# Patient Record
Sex: FEMALE | Race: WHITE
[De-identification: names, ages, dates, MRNs, and addresses within clinical notes are randomized per-mention and may not be internally consistent; named-entity substitution may affect disease eponyms.]

---

## 2018-09-10 ENCOUNTER — HOSPITAL ENCOUNTER (OUTPATIENT)
Dept: HOSPITAL 92 - ULT | Age: 63
Discharge: HOME | End: 2018-09-10
Payer: COMMERCIAL

## 2018-09-10 DIAGNOSIS — I31.3: ICD-10-CM

## 2018-09-10 DIAGNOSIS — G45.9: Primary | ICD-10-CM

## 2018-09-10 DIAGNOSIS — I08.1: ICD-10-CM

## 2018-09-10 PROCEDURE — 93880 EXTRACRANIAL BILAT STUDY: CPT

## 2018-09-10 PROCEDURE — 93306 TTE W/DOPPLER COMPLETE: CPT

## 2018-09-10 NOTE — ULT
BILATERAL CAROTID DUPLEX ULTRASOUND:

 

HISTORY: 

TIA.

 

FINDINGS: 

A small amount of plaque is seen in the carotid bulbs.

 

The peak systolic velocity in the right ICA measures 85 cm/s with an end-diastolic velocity of 26 cm/
s and a systolic ratio of 0.84.

 

The peak systolic velocity in the left ICA measures 68 cm/s with an end-diastolic velocity of 19 cm/s
 and a systolic ratio of 0.86.

 

Flow in both vertebral arteries remains antegrade.

 

IMPRESSION: 

No evidence of hemodynamically significant stenosis.

 

POS: [facility]

## 2018-12-02 ENCOUNTER — HOSPITAL ENCOUNTER (EMERGENCY)
Dept: HOSPITAL 92 - ERS | Age: 63
Discharge: HOME | End: 2018-12-02
Payer: COMMERCIAL

## 2018-12-02 DIAGNOSIS — E78.5: ICD-10-CM

## 2018-12-02 DIAGNOSIS — M48.061: Primary | ICD-10-CM

## 2018-12-02 DIAGNOSIS — I10: ICD-10-CM

## 2018-12-02 DIAGNOSIS — E03.9: ICD-10-CM

## 2018-12-02 DIAGNOSIS — E06.3: ICD-10-CM

## 2018-12-02 LAB
CRYSTAL-AUWI FLAG: 0 (ref 0–15)
HEV IGM SER QL: 2.7 (ref 0–7.99)
HYALINE CASTS #/AREA URNS LPF: (no result) LPF
PATHC CAST-AUWI FLAG: 0 (ref 0–2.49)
RBC UR QL AUTO: (no result) HPF (ref 0–3)
SP GR UR STRIP: 1 (ref 1–1.04)
SPERM-AUWI FLAG: 0 (ref 0–9.9)
WBC UR QL AUTO: (no result) HPF (ref 0–3)
YEAST-AUWI FLAG: 0 (ref 0–25)

## 2018-12-02 PROCEDURE — 72131 CT LUMBAR SPINE W/O DYE: CPT

## 2018-12-02 PROCEDURE — 81015 MICROSCOPIC EXAM OF URINE: CPT

## 2018-12-02 PROCEDURE — 81003 URINALYSIS AUTO W/O SCOPE: CPT

## 2018-12-02 NOTE — CT
CT LUMBAR SPINE NONCONTRAST:

 

12/02/2018

 

HISTORY:

A 63-year-old female with low back pain and right lumbar radiculopathy.

 

COMPARISON:

None.

 

FINDINGS:

The right kidney is atrophic.  Minimally dilated left renal collecting system.  There are five lumbar
 type vertebrae.  Vertebral body heights are maintained.  Mild disk space narrowing at L4-L5.  Modera
te disk space narrowing at L5-S1.  The rest of the disk spaces are maintained.  No large endplate mar
ginal osteophytes.

 

T12-L1:  Normal.

 

L1-L2:  Essentially normal.

 

L2-L3:  Mild degenerative facet changes.  No high-grade central or neural foraminal stenosis.  Mild d
iffuse disk bulge.

 

L3-L4:  Mild diffuse disk bulge.  Mild to moderate ligamentum flavum thickening.  Mild to moderate bi
lateral degenerative facet hypertrophy.  Mild central spinal canal stenosis.  Mild bilateral neural f
oraminal stenosis.

 

L4-L5:  Very severe bilateral degenerative facet disease causes a grade 1 anterolisthesis of L4 on L5
.  No pars interarticularis defects.  Severe ligamentum flavum thickening.  Severe central spinal can
al stenosis.  Moderate bilateral neural foraminal stenosis.

 

L5-S1:  Moderate to severe bilateral degenerative facet changes.  No central stenosis.  Mild to moder
ate right neural foraminal stenosis.  Moderate left neural foraminal stenosis.

 

IMPRESSION:

1.  At L4-L5, there is severe central spinal canal stenosis due to grade 1 spondylolisthesis, which i
s due to very severe facet osteoarthrosis.

 

2.  Atrophy of the right kidney.

 

POS: [facility]

## 2020-07-20 ENCOUNTER — HOSPITAL ENCOUNTER (OUTPATIENT)
Dept: HOSPITAL 57 - BURLAB | Age: 65
Discharge: HOME | End: 2020-07-20
Attending: FAMILY MEDICINE
Payer: COMMERCIAL

## 2020-07-20 DIAGNOSIS — R25.2: Primary | ICD-10-CM

## 2020-07-20 LAB
ANION GAP SERPL CALC-SCNC: 14 MMOL/L (ref 10–20)
BUN SERPL-MCNC: 21 MG/DL (ref 9.8–20.1)
CALCIUM SERPL-MCNC: 9 MG/DL (ref 7.8–10.44)
CHLORIDE SERPL-SCNC: 106 MMOL/L (ref 98–107)
CO2 SERPL-SCNC: 26 MMOL/L (ref 23–31)
CREAT CL PREDICTED SERPL C-G-VRATE: 0 ML/MIN (ref 70–130)
GLUCOSE SERPL-MCNC: 99 MG/DL (ref 80–115)
MAGNESIUM SERPL-MCNC: 2.4 MG/DL (ref 1.6–2.6)
POTASSIUM SERPL-SCNC: 3.7 MMOL/L (ref 3.5–5.1)
SODIUM SERPL-SCNC: 142 MMOL/L (ref 136–145)

## 2020-07-20 PROCEDURE — 80048 BASIC METABOLIC PNL TOTAL CA: CPT

## 2020-07-20 PROCEDURE — 36415 COLL VENOUS BLD VENIPUNCTURE: CPT

## 2020-07-20 PROCEDURE — 83735 ASSAY OF MAGNESIUM: CPT

## 2020-12-17 ENCOUNTER — HOSPITAL ENCOUNTER (EMERGENCY)
Dept: HOSPITAL 92 - ERS | Age: 65
Discharge: HOME | End: 2020-12-17
Payer: SELF-PAY

## 2020-12-17 DIAGNOSIS — I10: ICD-10-CM

## 2020-12-17 DIAGNOSIS — E03.9: ICD-10-CM

## 2020-12-17 DIAGNOSIS — E78.5: ICD-10-CM

## 2020-12-17 DIAGNOSIS — U07.1: Primary | ICD-10-CM

## 2020-12-17 LAB — SARS-COV-2 RNA RESP QL NAA+PROBE: DETECTED

## 2020-12-17 PROCEDURE — U0003 INFECTIOUS AGENT DETECTION BY NUCLEIC ACID (DNA OR RNA); SEVERE ACUTE RESPIRATORY SYNDROME CORONAVIRUS 2 (SARS-COV-2) (CORONAVIRUS DISEASE [COVID-19]), AMPLIFIED PROBE TECHNIQUE, MAKING USE OF HIGH THROUGHPUT TECHNOLOGIES AS DESCRIBED BY CMS-2020-01-R: HCPCS

## 2020-12-17 PROCEDURE — 87635 SARS-COV-2 COVID-19 AMP PRB: CPT

## 2020-12-17 PROCEDURE — 99283 EMERGENCY DEPT VISIT LOW MDM: CPT

## 2020-12-25 ENCOUNTER — HOSPITAL ENCOUNTER (EMERGENCY)
Dept: HOSPITAL 92 - ERS | Age: 65
Discharge: HOME | End: 2020-12-25
Payer: MEDICARE

## 2020-12-25 DIAGNOSIS — I11.0: ICD-10-CM

## 2020-12-25 DIAGNOSIS — E78.5: ICD-10-CM

## 2020-12-25 DIAGNOSIS — E03.9: ICD-10-CM

## 2020-12-25 DIAGNOSIS — U07.1: Primary | ICD-10-CM

## 2020-12-25 DIAGNOSIS — I50.30: ICD-10-CM

## 2020-12-25 DIAGNOSIS — Z79.899: ICD-10-CM

## 2020-12-25 PROCEDURE — 99284 EMERGENCY DEPT VISIT MOD MDM: CPT

## 2020-12-27 ENCOUNTER — HOSPITAL ENCOUNTER (INPATIENT)
Dept: HOSPITAL 92 - ERS | Age: 65
LOS: 11 days | Discharge: HOME | DRG: 177 | End: 2021-01-07
Attending: INTERNAL MEDICINE | Admitting: FAMILY MEDICINE
Payer: MEDICARE

## 2020-12-27 VITALS — BODY MASS INDEX: 27.1 KG/M2

## 2020-12-27 DIAGNOSIS — I16.0: ICD-10-CM

## 2020-12-27 DIAGNOSIS — Z88.8: ICD-10-CM

## 2020-12-27 DIAGNOSIS — E03.9: ICD-10-CM

## 2020-12-27 DIAGNOSIS — I13.0: ICD-10-CM

## 2020-12-27 DIAGNOSIS — E78.5: ICD-10-CM

## 2020-12-27 DIAGNOSIS — Z98.890: ICD-10-CM

## 2020-12-27 DIAGNOSIS — J12.82: ICD-10-CM

## 2020-12-27 DIAGNOSIS — N18.4: ICD-10-CM

## 2020-12-27 DIAGNOSIS — J96.01: ICD-10-CM

## 2020-12-27 DIAGNOSIS — Z79.899: ICD-10-CM

## 2020-12-27 DIAGNOSIS — Z86.73: ICD-10-CM

## 2020-12-27 DIAGNOSIS — Z79.890: ICD-10-CM

## 2020-12-27 DIAGNOSIS — I50.32: ICD-10-CM

## 2020-12-27 DIAGNOSIS — Z87.891: ICD-10-CM

## 2020-12-27 DIAGNOSIS — Z79.82: ICD-10-CM

## 2020-12-27 DIAGNOSIS — U07.1: Primary | ICD-10-CM

## 2020-12-27 DIAGNOSIS — Z90.710: ICD-10-CM

## 2020-12-27 DIAGNOSIS — Z79.51: ICD-10-CM

## 2020-12-27 LAB
ALBUMIN SERPL BCG-MCNC: 3.6 G/DL (ref 3.4–4.8)
ALP SERPL-CCNC: 80 U/L (ref 40–110)
ALT SERPL W P-5'-P-CCNC: 27 U/L (ref 8–55)
ANION GAP SERPL CALC-SCNC: 16 MMOL/L (ref 10–20)
AST SERPL-CCNC: 36 U/L (ref 5–34)
BACTERIA UR QL AUTO: (no result) HPF
BILIRUB SERPL-MCNC: 0.5 MG/DL (ref 0.2–1.2)
BUN SERPL-MCNC: 23 MG/DL (ref 9.8–20.1)
CALCIUM SERPL-MCNC: 8.6 MG/DL (ref 7.8–10.44)
CHLORIDE SERPL-SCNC: 101 MMOL/L (ref 98–107)
CO2 SERPL-SCNC: 23 MMOL/L (ref 23–31)
CREAT CL PREDICTED SERPL C-G-VRATE: 0 ML/MIN (ref 70–130)
FERRITIN SERPL-MCNC: 1277.35 NG/ML (ref 10–291)
GLOBULIN SER CALC-MCNC: 3.1 G/DL (ref 2.4–3.5)
GLUCOSE SERPL-MCNC: 125 MG/DL (ref 80–115)
HGB BLD-MCNC: 15.7 G/DL (ref 12–16)
LEUKOCYTE ESTERASE UR QL STRIP.AUTO: 250 LEU/UL
MCH RBC QN AUTO: 32.3 PG (ref 27–31)
MCV RBC AUTO: 97.6 FL (ref 78–98)
MDIFF COMPLETE?: YES
PLATELET # BLD AUTO: 213 THOU/UL (ref 130–400)
POTASSIUM SERPL-SCNC: 3.7 MMOL/L (ref 3.5–5.1)
PROT UR STRIP.AUTO-MCNC: 100 MG/DL
RBC # BLD AUTO: 4.87 MILL/UL (ref 4.2–5.4)
RBC UR QL AUTO: (no result) HPF (ref 0–3)
SODIUM SERPL-SCNC: 136 MMOL/L (ref 136–145)
SP GR UR STRIP: 1.02 (ref 1–1.04)
TSH SERPL DL<=0.005 MIU/L-ACNC: 0.17 UIU/ML (ref 0.35–4.94)
WBC # BLD AUTO: 15.7 THOU/UL (ref 4.8–10.8)
WBC UR QL AUTO: (no result) HPF (ref 0–3)

## 2020-12-27 PROCEDURE — XW13325 TRANSFUSION OF CONVALESCENT PLASMA (NONAUTOLOGOUS) INTO PERIPHERAL VEIN, PERCUTANEOUS APPROACH, NEW TECHNOLOGY GROUP 5: ICD-10-PCS | Performed by: PHYSICIAN ASSISTANT

## 2020-12-27 PROCEDURE — 71045 X-RAY EXAM CHEST 1 VIEW: CPT

## 2020-12-27 PROCEDURE — 85652 RBC SED RATE AUTOMATED: CPT

## 2020-12-27 PROCEDURE — 99284 EMERGENCY DEPT VISIT MOD MDM: CPT

## 2020-12-27 PROCEDURE — 83605 ASSAY OF LACTIC ACID: CPT

## 2020-12-27 PROCEDURE — 85025 COMPLETE CBC W/AUTO DIFF WBC: CPT

## 2020-12-27 PROCEDURE — 81003 URINALYSIS AUTO W/O SCOPE: CPT

## 2020-12-27 PROCEDURE — 81015 MICROSCOPIC EXAM OF URINE: CPT

## 2020-12-27 PROCEDURE — 8E0ZXY6 ISOLATION: ICD-10-PCS | Performed by: PEDIATRICS

## 2020-12-27 PROCEDURE — 86140 C-REACTIVE PROTEIN: CPT

## 2020-12-27 PROCEDURE — 70450 CT HEAD/BRAIN W/O DYE: CPT

## 2020-12-27 PROCEDURE — 86900 BLOOD TYPING SEROLOGIC ABO: CPT

## 2020-12-27 PROCEDURE — 87040 BLOOD CULTURE FOR BACTERIA: CPT

## 2020-12-27 PROCEDURE — 96375 TX/PRO/DX INJ NEW DRUG ADDON: CPT

## 2020-12-27 PROCEDURE — 86901 BLOOD TYPING SEROLOGIC RH(D): CPT

## 2020-12-27 PROCEDURE — 36415 COLL VENOUS BLD VENIPUNCTURE: CPT

## 2020-12-27 PROCEDURE — P9017 PLASMA 1 DONOR FRZ W/IN 8 HR: HCPCS

## 2020-12-27 PROCEDURE — 36430 TRANSFUSION BLD/BLD COMPNT: CPT

## 2020-12-27 PROCEDURE — 84484 ASSAY OF TROPONIN QUANT: CPT

## 2020-12-27 PROCEDURE — 85379 FIBRIN DEGRADATION QUANT: CPT

## 2020-12-27 PROCEDURE — 83880 ASSAY OF NATRIURETIC PEPTIDE: CPT

## 2020-12-27 PROCEDURE — 86850 RBC ANTIBODY SCREEN: CPT

## 2020-12-27 PROCEDURE — 96365 THER/PROPH/DIAG IV INF INIT: CPT

## 2020-12-27 PROCEDURE — XW033E5 INTRODUCTION OF REMDESIVIR ANTI-INFECTIVE INTO PERIPHERAL VEIN, PERCUTANEOUS APPROACH, NEW TECHNOLOGY GROUP 5: ICD-10-PCS | Performed by: PHYSICIAN ASSISTANT

## 2020-12-27 PROCEDURE — 84443 ASSAY THYROID STIM HORMONE: CPT

## 2020-12-27 PROCEDURE — 80048 BASIC METABOLIC PNL TOTAL CA: CPT

## 2020-12-27 PROCEDURE — 93005 ELECTROCARDIOGRAM TRACING: CPT

## 2020-12-27 PROCEDURE — 82728 ASSAY OF FERRITIN: CPT

## 2020-12-27 PROCEDURE — 83615 LACTATE (LD) (LDH) ENZYME: CPT

## 2020-12-27 PROCEDURE — 80053 COMPREHEN METABOLIC PANEL: CPT

## 2020-12-27 RX ADMIN — HEPARIN SODIUM SCH UNITS: 5000 INJECTION, SOLUTION INTRAVENOUS; SUBCUTANEOUS at 15:17

## 2020-12-27 RX ADMIN — HEPARIN SODIUM SCH UNITS: 5000 INJECTION, SOLUTION INTRAVENOUS; SUBCUTANEOUS at 20:57

## 2020-12-27 RX ADMIN — CEFTRIAXONE SCH MLS: 1 INJECTION, POWDER, FOR SOLUTION INTRAMUSCULAR; INTRAVENOUS at 16:28

## 2020-12-27 RX ADMIN — AZITHROMYCIN SCH MLS: 500 INJECTION, POWDER, LYOPHILIZED, FOR SOLUTION INTRAVENOUS at 15:17

## 2020-12-27 NOTE — RAD
Chest AP view



INDICATION: History of shortness of breath with Covid positive diagnosis



COMPARISON: Prior exam dated September 30, 2013



FINDINGS:



Lungs:  There are interstitial and airspace opacities in the left lower lobe and right lower lobe robert
picious for pneumonia



Cardiac silhouette:  There is mild cardiomegaly



Pulmonary vasculature:  Normal



Pleural spaces:  No pleural effusion or pneumothorax is demonstrated.



Upper abdomen:  No abnormality seen.



Osseous structures:  No acute osseous abnormality.



Additional findings:  None.



IMPRESSION: 

Bilateral lower lobe pneumonia.

Mild cardiomegaly without evidence of cardiac decompensation.



Reported By: Montrell Alfonso 

Electronically Signed:  12/27/2020 9:30 AM

## 2020-12-27 NOTE — CT
CT BRAIN NONCONTRAST:



DATE:

12/27/2020



HISTORY:

65-year-old female with severe headache



FINDINGS:

There is no evidence of acute intra-axial or extra-axial hemorrhage. There is no midline shift or any
 other mass effect. There is no extra-axial fluid collection. There is no evidence of obstructive

hydrocephalus. Calvarium is intact.



IMPRESSION:

No acute intracranial findings.



Reported By: Donavan Jennings 

Electronically Signed:  12/27/2020 10:27 PM

## 2020-12-27 NOTE — PDOC.EVN
Event Note





- Event Note


Event Note: 





Was called by nursing that patient was having severe hip pain and could hear 

patient moaning in background. No known injury to hip. One time dose of pain 

medication ordered along with lidocaine patch.

## 2020-12-27 NOTE — PDOC.BPN
- Brief Progress Note


Encounter Date: 12/27/20


Encounter Time: 15:55





Chart reviewed, orders reviewed and patient discussed with ARMINDA Magallanes.  Pt with 

multilobar pneumonia with sx onset a week ago.  She was significantly hypoxic 

and responded well to oxygen supplementation.  She is written for steroids, 

remdesivir, convalescent plasma.  Monitor closely for any change in respiratory 

status.

## 2020-12-27 NOTE — PDOC.EVN
Event Note





- Event Note


Event Note: 





Nursing called and said that patient was having severe headache and it was the 

"worst of her life". Stat head CT ordered at this time. No focal deficits or 

neurological changes noted.

## 2020-12-27 NOTE — PDOC.HHP
Hospitalist HPI





- History of Present Illness


Shortness of breath


History of Present Illness: 





Ms. De La Torre is a 65-year-old female with a past medical history of 

hypothyroidism, hypertension, hyperlipidemia, TIA, congestive heart failure, 

chronic kidney disease who presented to the emergency room for shortness of 

breath with COVID-19 positive status.  Patient reports that her symptoms started

with nausea, vomiting and diarrhea approximately 1 week ago, however her 

symptoms of cough shortness of breath myalgias and subjective fevers began a few

days later.  She states that her respiratory symptoms worsened and she presented

to the emergency room on 12/25 but at that time was maintaining her oxygenation 

at 97% on room air so was discharged home.  Patient reports that over the past 2

days she has had worsening of her breathing and increasing shortness of breath 

even at rest prompting her to represent.





In emergency room initial vital signs 150/78, 102, 70, 21, 76% on room air.  

Patient's respiratory status greatly improved to 96% on room air on 4 L nasal 

cannula.  EKG showed normal sinus rhythm with inverted T waves but no ST 

changes.  Initial troponin 0 0.010.  Chest x-ray showed diffuse bilateral patchy

infiltrates and mild cardiomegaly.  .  H/H 15.7/47.5.  WBC 15.7.  BUN/CR 

23/1.79.  Sodium 136, potassium 3.7.  Glucose 125.  AST/ALT 36/27, T bili 0.5, 

D-dimer 0.99.  Patient received azithromycin and dexamethasone in the emergency 

room.  She admitted to the hospital service for further management of her COVID-

19 pneumonia.





Hospitalist ROS





- Review of Systems


Constitutional: reports: fever, chills, weakness, malaise


Eyes: denies: pain, vision change, conjunctivae inflammation, eyelid 

inflammation, redness, other


ENT: reports: nose congestion, throat pain.  denies: ear pain, ear discharge, 

nose pain, nose discharge, mouth pain, mouth swelling, throat swelling, other


Respiratory: reports: cough, dry, shortness of breath, SOB with excertion.  

denies: hemoptysis, pleuritic pain, sputum, wheezing, other


Cardiovascular: denies: chest pain, palpitations, orthopnea, paroxysmal noc. 

dyspnea, edema, light headedness, other


Gastrointestinal: denies: nausea, vomiting, abdominal pain, diarrhea, 

constipation, melena, hematochezia, other


Genitourinary: denies: dysuria, frequency, incontinence, hematuria, retention, 

other


Musculoskeletal: denies: neck pain, shoulder pain, arm pain, back pain, hand 

pain, leg pain, foot pain, other


Skin: denies: rash, lesions, arpan, bruising, other


Neurological: denies: weakness, numbness, incoordination, change in speech, 

confusion, seizures, other





- Medication


Medications: 


All medications include





Albuterol


Bupropion


Clonidine


Clopidogrel


Coreg


Dexamethasone


Hydralazine


Klor-Con


Levothyroxine


Zofran





Allergy to lisinopril which causes angioedema





Hospitalist History





- Past Medical History


Other Medical History: 





Past medical history includes





Hypothyroidism


Hypertension


Congestive heart failure


Hyperlipidemia


TIA


Chronic kidney disease stage IV


Remote methamphetamine use, tobacco use








- Past Surgical History


Other Surgical History: 





Past surgical history includes





Carpal tunnel sertraline


Hysterectomy


Oophorectomy





- Family History


Other Family History: 





Denies family history of cardiac disease or cancer





- Social History


Smoking Status: Former smoker


Tobacco Type: cigarettes


Alcohol: reports: Rare


Drugs: reports: none (Former methamphetamine abuse)


Living Situation: Alone


Activity level: independent ambulation





- Exam


General Appearance: NAD, awake alert


General - other findings: Patient appears comfortable on 4 L nasal cannula


Eye: PERRL, anicteric sclera


ENT: normocephalic atraumatic, no oropharyngeal lesions, moist mucosa


Neck: supple, symmetric, no JVD, no thyromegaly, no lymphadenopathy, no carotid 

bruit


Heart: RRR, no murmur, no gallops, no rubs, normal peripheral pulses


Respiratory: normal chest expansion, no tachypnea


Respiratory - other findings: Rales throughout


Gastrointestinal: soft, non-tender, non-distended, normal bowel sounds, no 

palpable masses, no hepatomegaly, no splenomegaly, no bruit


Extremities: no cyanosis, no clubbing, no edema


Skin: normal turgor, no lesions, no rashes


Neurological: cranial nerve grossly intact, normal sensation to touch, no 

weakness, no focal deficits, no new deficit


Musculoskeletal: normal tone, normal strength, no muscle wasting


Psychiatric: normal affect, normal behavior, A&O x 3





Hospitalist Results





- Labs


Result Diagrams: 


                                 12/27/20 09:37





                                 12/27/20 09:37


Lab results: 


                                        











WBC  15.7 thou/uL (4.8-10.8)  H  12/27/20  09:37    


 


Hgb  15.7 g/dL (12.0-16.0)   12/27/20  09:37    


 


Hct  47.5 % (36.0-47.0)  H  12/27/20  09:37    


 


MCV  97.6 fL (78.0-98.0)   12/27/20  09:37    


 


Plt Count  213 thou/uL (130-400)   12/27/20  09:37    


 


Sodium  136 mmol/L (136-145)   12/27/20  09:37    


 


Potassium  3.7 mmol/L (3.5-5.1)   12/27/20  09:37    


 


Chloride  101 mmol/L ()   12/27/20  09:37    


 


Carbon Dioxide  23 mmol/L (23-31)   12/27/20  09:37    


 


BUN  23 mg/dL (9.8-20.1)  H  12/27/20  09:37    


 


Creatinine  1.79 mg/dL (0.6-1.1)  H  12/27/20  09:37    


 


Glucose  125 mg/dL ()  H  12/27/20  09:37    


 


Lactic Acid  1.4 mmol/L (0.5-2.2)   12/27/20  09:37    


 


Calcium  8.6 mg/dL (7.8-10.44)   12/27/20  09:37    


 


Total Bilirubin  0.5 mg/dL (0.2-1.2)   12/27/20  09:37    


 


AST  36 U/L (5-34)  H  12/27/20  09:37    


 


ALT  27 U/L (8-55)   12/27/20  09:37    


 


Alkaline Phosphatase  80 U/L ()   12/27/20  09:37    


 


Troponin I  Less than  0.010 ng/mL (< 0.028)   12/27/20  09:37    


 


B-Natriuretic Peptide  204.9 pg/mL (0-100)  H  12/27/20  09:37    


 


Serum Total Protein  6.7 g/dL (6.0-8.3)   12/27/20  09:37    


 


Albumin  3.6 g/dL (3.4-4.8)   12/27/20  09:37    


 


Urine Ketones  Negative mg/dL (Negative)   12/27/20  10:08    


 


Urine Blood  Negative  (Negative)   12/27/20  10:08    


 


Urine Nitrite  Negative  (Negative)   12/27/20  10:08    


 


Ur Leukocyte Esterase  250 Rajinder/uL (Negative)  A  12/27/20  10:08    


 


Urine RBC  0-3 HPF (0-3)   12/27/20  10:08    


 


Urine WBC  7-10 HPF (0-3)  A  12/27/20  10:08    


 


Ur Squamous Epith Cells  0-3 HPF (0-3)   12/27/20  10:08    


 


Urine Bacteria  Rare-Few HPF (None Seen)   12/27/20  10:08    














Hospitalist H&P A/P





- Plan


Plan: 





COVID-19 pneumonia


65-year-old female past medical history of hypothyroidism, hypertension, 

hyperlipidemia, diastolic heart failure, TIA, chronic kidney disease presents 

with worsening shortness of breath found to have COVID-19.  Symptoms began 

approximately 7 days ago and have been progressive.  Chest x-ray showed 

bilateral patchy infiltrates and mild cardiomegaly.  White blood cell count 

15.7.  Patient presented with O2 sat of 76% on room air, is now comfortable 

saturating at 96% on 4 L nasal cannula.  Patient received dexamethasone and 

azithromycin in the emergency room.





Plan


-Decadron, will see patient is candidate for remdesivir


-Ceftriaxone, azithromycin


-Supplemental oxygen


-Tylenol, Robitussin


-Vitamin C, zinc


-We will obtain baseline inflammatory markers





Acute hypoxic respiratory failure


Patient with acute hypoxic respiratory failure secondary to moderate to severe 

COVID-19 pneumonia.  Presented with O2 sat of 76% on room air.  Patient with new

oxygen requirement now on 4 L of oxygen nasal cannula to maintain O2 sat.  We 

will continue supplemental oxygen and closely monitor respiratory status.  Poly

tment as above.





Plan


-Supplemental oxygen


-Treatment as above


-Closely monitor respiratory status





Chronic kidney disease


History of CKD with baseline creatinine approximately 1.7.  BUN/CR 23/1.79.





Plan


Trend kidney function


Avoid nephrotoxic agents when possible


Renal dosing as appropriate





Hypothyroidism


History of hypothyroidism, will continue home levothyroxine.





Hypertension


History of hypertension on home hydralazine and clonidine.  We will continue 

these.





Diastolic heart failure


History of diastolic heart failure.  On record review echocardiogram from 2018 

shows EF of 50 to 55%.  Patient reports she has mild fluid symptoms and 

occasionally gets lower extremity edema.  Feels as though she is dry due to her 

diarrheal symptoms.  .





Plan


Monitor fluid status





History of TIA


Patient with remote history of TIA, is maintained on Plavix.  We will continue 

while inpatient.








DVT prophylaxis SQ heparin


Full code





Case discussed with Dr. Hernandez

## 2020-12-28 LAB
ANION GAP SERPL CALC-SCNC: 13 MMOL/L (ref 10–20)
BASOPHILS # BLD AUTO: 0 THOU/UL (ref 0–0.2)
BASOPHILS NFR BLD AUTO: 0.1 % (ref 0–1)
BUN SERPL-MCNC: 25 MG/DL (ref 9.8–20.1)
CALCIUM SERPL-MCNC: 8.4 MG/DL (ref 7.8–10.44)
CHLORIDE SERPL-SCNC: 103 MMOL/L (ref 98–107)
CO2 SERPL-SCNC: 26 MMOL/L (ref 23–31)
CREAT CL PREDICTED SERPL C-G-VRATE: 37 ML/MIN (ref 70–130)
EOSINOPHIL # BLD AUTO: 0 THOU/UL (ref 0–0.7)
EOSINOPHIL NFR BLD AUTO: 0.1 % (ref 0–10)
GLUCOSE SERPL-MCNC: 80 MG/DL (ref 80–115)
HGB BLD-MCNC: 15.2 G/DL (ref 12–16)
LYMPHOCYTES # BLD: 0.7 THOU/UL (ref 1.2–3.4)
LYMPHOCYTES NFR BLD AUTO: 4.4 % (ref 21–51)
MCH RBC QN AUTO: 32 PG (ref 27–31)
MCV RBC AUTO: 96.9 FL (ref 78–98)
MONOCYTES # BLD AUTO: 0.8 THOU/UL (ref 0.11–0.59)
MONOCYTES NFR BLD AUTO: 4.8 % (ref 0–10)
NEUTROPHILS # BLD AUTO: 15.3 THOU/UL (ref 1.4–6.5)
NEUTROPHILS NFR BLD AUTO: 90.6 % (ref 42–75)
PLATELET # BLD AUTO: 289 THOU/UL (ref 130–400)
POTASSIUM SERPL-SCNC: 3.8 MMOL/L (ref 3.5–5.1)
RBC # BLD AUTO: 4.75 MILL/UL (ref 4.2–5.4)
SODIUM SERPL-SCNC: 138 MMOL/L (ref 136–145)
WBC # BLD AUTO: 16.9 THOU/UL (ref 4.8–10.8)

## 2020-12-28 RX ADMIN — CEFTRIAXONE SCH MLS: 1 INJECTION, POWDER, FOR SOLUTION INTRAMUSCULAR; INTRAVENOUS at 16:05

## 2020-12-28 RX ADMIN — Medication SCH EACH: at 08:03

## 2020-12-28 RX ADMIN — AZITHROMYCIN SCH MLS: 500 INJECTION, POWDER, LYOPHILIZED, FOR SOLUTION INTRAVENOUS at 14:31

## 2020-12-28 RX ADMIN — BUPROPION HYDROCHLORIDE SCH MG: 150 TABLET, FILM COATED, EXTENDED RELEASE ORAL at 08:02

## 2020-12-28 RX ADMIN — Medication SCH MG: at 08:02

## 2020-12-28 RX ADMIN — HEPARIN SODIUM SCH UNITS: 5000 INJECTION, SOLUTION INTRAVENOUS; SUBCUTANEOUS at 08:01

## 2020-12-28 RX ADMIN — HEPARIN SODIUM SCH UNITS: 5000 INJECTION, SOLUTION INTRAVENOUS; SUBCUTANEOUS at 14:29

## 2020-12-28 RX ADMIN — HEPARIN SODIUM SCH UNITS: 5000 INJECTION, SOLUTION INTRAVENOUS; SUBCUTANEOUS at 20:15

## 2020-12-29 LAB
ANION GAP SERPL CALC-SCNC: 17 MMOL/L (ref 10–20)
BUN SERPL-MCNC: 26 MG/DL (ref 9.8–20.1)
CALCIUM SERPL-MCNC: 8.4 MG/DL (ref 7.8–10.44)
CHLORIDE SERPL-SCNC: 102 MMOL/L (ref 98–107)
CO2 SERPL-SCNC: 23 MMOL/L (ref 23–31)
CREAT CL PREDICTED SERPL C-G-VRATE: 40 ML/MIN (ref 70–130)
GLUCOSE SERPL-MCNC: 97 MG/DL (ref 80–115)
HGB BLD-MCNC: 15.4 G/DL (ref 12–16)
MCH RBC QN AUTO: 31.9 PG (ref 27–31)
MCV RBC AUTO: 97.3 FL (ref 78–98)
MDIFF COMPLETE?: YES
PLATELET # BLD AUTO: 275 THOU/UL (ref 130–400)
POTASSIUM SERPL-SCNC: 3.5 MMOL/L (ref 3.5–5.1)
RBC # BLD AUTO: 4.82 MILL/UL (ref 4.2–5.4)
SODIUM SERPL-SCNC: 138 MMOL/L (ref 136–145)
WBC # BLD AUTO: 9.9 THOU/UL (ref 4.8–10.8)

## 2020-12-29 RX ADMIN — CEFTRIAXONE SCH MLS: 1 INJECTION, POWDER, FOR SOLUTION INTRAMUSCULAR; INTRAVENOUS at 14:19

## 2020-12-29 RX ADMIN — HEPARIN SODIUM SCH UNITS: 5000 INJECTION, SOLUTION INTRAVENOUS; SUBCUTANEOUS at 09:03

## 2020-12-29 RX ADMIN — BUPROPION HYDROCHLORIDE SCH MG: 150 TABLET, FILM COATED, EXTENDED RELEASE ORAL at 09:02

## 2020-12-29 RX ADMIN — Medication SCH EACH: at 10:06

## 2020-12-29 RX ADMIN — Medication SCH MG: at 09:02

## 2020-12-29 RX ADMIN — HEPARIN SODIUM SCH UNITS: 5000 INJECTION, SOLUTION INTRAVENOUS; SUBCUTANEOUS at 20:53

## 2020-12-29 RX ADMIN — HEPARIN SODIUM SCH UNITS: 5000 INJECTION, SOLUTION INTRAVENOUS; SUBCUTANEOUS at 14:19

## 2020-12-30 LAB
ANION GAP SERPL CALC-SCNC: 16 MMOL/L (ref 10–20)
BASOPHILS # BLD AUTO: 0 THOU/UL (ref 0–0.2)
BASOPHILS NFR BLD AUTO: 0 % (ref 0–1)
BUN SERPL-MCNC: 27 MG/DL (ref 9.8–20.1)
CALCIUM SERPL-MCNC: 8.6 MG/DL (ref 7.8–10.44)
CHLORIDE SERPL-SCNC: 102 MMOL/L (ref 98–107)
CO2 SERPL-SCNC: 24 MMOL/L (ref 23–31)
CREAT CL PREDICTED SERPL C-G-VRATE: 39 ML/MIN (ref 70–130)
EOSINOPHIL # BLD AUTO: 0 THOU/UL (ref 0–0.7)
EOSINOPHIL NFR BLD AUTO: 0.2 % (ref 0–10)
GLUCOSE SERPL-MCNC: 109 MG/DL (ref 80–115)
HGB BLD-MCNC: 14.5 G/DL (ref 12–16)
LYMPHOCYTES # BLD: 1 THOU/UL (ref 1.2–3.4)
LYMPHOCYTES NFR BLD AUTO: 8.6 % (ref 21–51)
MCH RBC QN AUTO: 31.4 PG (ref 27–31)
MCV RBC AUTO: 97.5 FL (ref 78–98)
MONOCYTES # BLD AUTO: 0.4 THOU/UL (ref 0.11–0.59)
MONOCYTES NFR BLD AUTO: 3.6 % (ref 0–10)
NEUTROPHILS # BLD AUTO: 9.8 THOU/UL (ref 1.4–6.5)
NEUTROPHILS NFR BLD AUTO: 87.6 % (ref 42–75)
PLATELET # BLD AUTO: 300 THOU/UL (ref 130–400)
POTASSIUM SERPL-SCNC: 3.6 MMOL/L (ref 3.5–5.1)
RBC # BLD AUTO: 4.62 MILL/UL (ref 4.2–5.4)
SODIUM SERPL-SCNC: 138 MMOL/L (ref 136–145)
WBC # BLD AUTO: 11.2 THOU/UL (ref 4.8–10.8)

## 2020-12-30 RX ADMIN — HEPARIN SODIUM SCH UNITS: 5000 INJECTION, SOLUTION INTRAVENOUS; SUBCUTANEOUS at 20:35

## 2020-12-30 RX ADMIN — BUPROPION HYDROCHLORIDE SCH MG: 150 TABLET, FILM COATED, EXTENDED RELEASE ORAL at 08:29

## 2020-12-30 RX ADMIN — Medication SCH EACH: at 08:31

## 2020-12-30 RX ADMIN — CEFTRIAXONE SCH MLS: 1 INJECTION, POWDER, FOR SOLUTION INTRAMUSCULAR; INTRAVENOUS at 16:02

## 2020-12-30 RX ADMIN — HEPARIN SODIUM SCH UNITS: 5000 INJECTION, SOLUTION INTRAVENOUS; SUBCUTANEOUS at 08:29

## 2020-12-30 RX ADMIN — Medication SCH MG: at 08:30

## 2020-12-30 RX ADMIN — HEPARIN SODIUM SCH UNITS: 5000 INJECTION, SOLUTION INTRAVENOUS; SUBCUTANEOUS at 15:16

## 2020-12-31 LAB
ANION GAP SERPL CALC-SCNC: 15 MMOL/L (ref 10–20)
BASOPHILS # BLD AUTO: 0 THOU/UL (ref 0–0.2)
BASOPHILS NFR BLD AUTO: 0.1 % (ref 0–1)
BUN SERPL-MCNC: 24 MG/DL (ref 9.8–20.1)
CALCIUM SERPL-MCNC: 8.4 MG/DL (ref 7.8–10.44)
CHLORIDE SERPL-SCNC: 101 MMOL/L (ref 98–107)
CO2 SERPL-SCNC: 24 MMOL/L (ref 23–31)
CREAT CL PREDICTED SERPL C-G-VRATE: 45 ML/MIN (ref 70–130)
EOSINOPHIL # BLD AUTO: 0 THOU/UL (ref 0–0.7)
EOSINOPHIL NFR BLD AUTO: 0.1 % (ref 0–10)
GLUCOSE SERPL-MCNC: 83 MG/DL (ref 80–115)
HGB BLD-MCNC: 13.8 G/DL (ref 12–16)
LYMPHOCYTES # BLD: 1 THOU/UL (ref 1.2–3.4)
LYMPHOCYTES NFR BLD AUTO: 8.5 % (ref 21–51)
MCH RBC QN AUTO: 32.3 PG (ref 27–31)
MCV RBC AUTO: 96.9 FL (ref 78–98)
MONOCYTES # BLD AUTO: 0.6 THOU/UL (ref 0.11–0.59)
MONOCYTES NFR BLD AUTO: 5 % (ref 0–10)
NEUTROPHILS # BLD AUTO: 9.8 THOU/UL (ref 1.4–6.5)
NEUTROPHILS NFR BLD AUTO: 86.2 % (ref 42–75)
PLATELET # BLD AUTO: 283 THOU/UL (ref 130–400)
POTASSIUM SERPL-SCNC: 4 MMOL/L (ref 3.5–5.1)
RBC # BLD AUTO: 4.27 MILL/UL (ref 4.2–5.4)
SODIUM SERPL-SCNC: 136 MMOL/L (ref 136–145)
WBC # BLD AUTO: 11.3 THOU/UL (ref 4.8–10.8)

## 2020-12-31 RX ADMIN — HEPARIN SODIUM SCH UNITS: 5000 INJECTION, SOLUTION INTRAVENOUS; SUBCUTANEOUS at 20:34

## 2020-12-31 RX ADMIN — Medication SCH EACH: at 08:19

## 2020-12-31 RX ADMIN — BUPROPION HYDROCHLORIDE SCH MG: 150 TABLET, FILM COATED, EXTENDED RELEASE ORAL at 08:17

## 2020-12-31 RX ADMIN — Medication SCH MG: at 08:17

## 2020-12-31 RX ADMIN — HEPARIN SODIUM SCH UNITS: 5000 INJECTION, SOLUTION INTRAVENOUS; SUBCUTANEOUS at 08:17

## 2020-12-31 RX ADMIN — HEPARIN SODIUM SCH UNITS: 5000 INJECTION, SOLUTION INTRAVENOUS; SUBCUTANEOUS at 15:42

## 2020-12-31 RX ADMIN — CEFTRIAXONE SCH MLS: 1 INJECTION, POWDER, FOR SOLUTION INTRAMUSCULAR; INTRAVENOUS at 15:42

## 2021-01-01 LAB
ANION GAP SERPL CALC-SCNC: 16 MMOL/L (ref 10–20)
BUN SERPL-MCNC: 21 MG/DL (ref 9.8–20.1)
CALCIUM SERPL-MCNC: 8.8 MG/DL (ref 7.8–10.44)
CHLORIDE SERPL-SCNC: 100 MMOL/L (ref 98–107)
CO2 SERPL-SCNC: 25 MMOL/L (ref 23–31)
CREAT CL PREDICTED SERPL C-G-VRATE: 43 ML/MIN (ref 70–130)
GLUCOSE SERPL-MCNC: 105 MG/DL (ref 80–115)
HGB BLD-MCNC: 14.7 G/DL (ref 12–16)
MCH RBC QN AUTO: 32.1 PG (ref 27–31)
MCV RBC AUTO: 97.1 FL (ref 78–98)
MDIFF COMPLETE?: YES
PLATELET # BLD AUTO: 279 THOU/UL (ref 130–400)
POTASSIUM SERPL-SCNC: 3.7 MMOL/L (ref 3.5–5.1)
RBC # BLD AUTO: 4.58 MILL/UL (ref 4.2–5.4)
SODIUM SERPL-SCNC: 137 MMOL/L (ref 136–145)
TOXIC GRANULES BLD QL SMEAR: SLIGHT
WBC # BLD AUTO: 14.6 THOU/UL (ref 4.8–10.8)

## 2021-01-01 RX ADMIN — HEPARIN SODIUM SCH UNITS: 5000 INJECTION, SOLUTION INTRAVENOUS; SUBCUTANEOUS at 21:10

## 2021-01-01 RX ADMIN — Medication SCH MG: at 08:40

## 2021-01-01 RX ADMIN — Medication SCH EACH: at 08:42

## 2021-01-01 RX ADMIN — BUPROPION HYDROCHLORIDE SCH MG: 150 TABLET, FILM COATED, EXTENDED RELEASE ORAL at 08:41

## 2021-01-01 RX ADMIN — HEPARIN SODIUM SCH UNITS: 5000 INJECTION, SOLUTION INTRAVENOUS; SUBCUTANEOUS at 08:40

## 2021-01-01 RX ADMIN — HEPARIN SODIUM SCH UNITS: 5000 INJECTION, SOLUTION INTRAVENOUS; SUBCUTANEOUS at 14:57

## 2021-01-01 RX ADMIN — CEFTRIAXONE SCH MLS: 1 INJECTION, POWDER, FOR SOLUTION INTRAMUSCULAR; INTRAVENOUS at 15:39

## 2021-01-02 RX ADMIN — HEPARIN SODIUM SCH UNITS: 5000 INJECTION, SOLUTION INTRAVENOUS; SUBCUTANEOUS at 09:22

## 2021-01-02 RX ADMIN — CEFTRIAXONE SCH MLS: 1 INJECTION, POWDER, FOR SOLUTION INTRAMUSCULAR; INTRAVENOUS at 14:55

## 2021-01-02 RX ADMIN — HEPARIN SODIUM SCH UNITS: 5000 INJECTION, SOLUTION INTRAVENOUS; SUBCUTANEOUS at 20:00

## 2021-01-02 RX ADMIN — Medication SCH EACH: at 09:23

## 2021-01-02 RX ADMIN — BUPROPION HYDROCHLORIDE SCH MG: 150 TABLET, FILM COATED, EXTENDED RELEASE ORAL at 09:22

## 2021-01-02 RX ADMIN — HEPARIN SODIUM SCH UNITS: 5000 INJECTION, SOLUTION INTRAVENOUS; SUBCUTANEOUS at 14:55

## 2021-01-02 RX ADMIN — Medication SCH MG: at 09:21

## 2021-01-03 RX ADMIN — HEPARIN SODIUM SCH UNITS: 5000 INJECTION, SOLUTION INTRAVENOUS; SUBCUTANEOUS at 15:31

## 2021-01-03 RX ADMIN — HEPARIN SODIUM SCH UNITS: 5000 INJECTION, SOLUTION INTRAVENOUS; SUBCUTANEOUS at 08:11

## 2021-01-03 RX ADMIN — Medication SCH EACH: at 08:11

## 2021-01-03 RX ADMIN — Medication SCH MG: at 08:10

## 2021-01-03 RX ADMIN — HEPARIN SODIUM SCH UNITS: 5000 INJECTION, SOLUTION INTRAVENOUS; SUBCUTANEOUS at 20:37

## 2021-01-03 RX ADMIN — BUPROPION HYDROCHLORIDE SCH MG: 150 TABLET, FILM COATED, EXTENDED RELEASE ORAL at 08:11

## 2021-01-03 RX ADMIN — CEFTRIAXONE SCH MLS: 1 INJECTION, POWDER, FOR SOLUTION INTRAMUSCULAR; INTRAVENOUS at 15:31

## 2021-01-04 RX ADMIN — CEFTRIAXONE SCH MLS: 1 INJECTION, POWDER, FOR SOLUTION INTRAMUSCULAR; INTRAVENOUS at 15:06

## 2021-01-04 RX ADMIN — BUPROPION HYDROCHLORIDE SCH MG: 150 TABLET, FILM COATED, EXTENDED RELEASE ORAL at 09:01

## 2021-01-04 RX ADMIN — Medication SCH MG: at 09:03

## 2021-01-04 RX ADMIN — Medication SCH EACH: at 09:04

## 2021-01-04 RX ADMIN — DOCUSATE SODIUM 50 MG AND SENNOSIDES 8.6 MG PRN TAB: 8.6; 5 TABLET, FILM COATED ORAL at 21:22

## 2021-01-04 RX ADMIN — HEPARIN SODIUM SCH UNITS: 5000 INJECTION, SOLUTION INTRAVENOUS; SUBCUTANEOUS at 09:01

## 2021-01-05 RX ADMIN — BUPROPION HYDROCHLORIDE SCH MG: 150 TABLET, FILM COATED, EXTENDED RELEASE ORAL at 08:40

## 2021-01-05 RX ADMIN — Medication SCH MG: at 11:01

## 2021-01-05 RX ADMIN — Medication SCH EACH: at 08:40

## 2021-01-05 RX ADMIN — CEFTRIAXONE SCH MLS: 1 INJECTION, POWDER, FOR SOLUTION INTRAMUSCULAR; INTRAVENOUS at 15:39

## 2021-01-06 RX ADMIN — Medication SCH EACH: at 08:19

## 2021-01-06 RX ADMIN — Medication SCH MG: at 08:19

## 2021-01-06 RX ADMIN — DOCUSATE SODIUM 50 MG AND SENNOSIDES 8.6 MG PRN TAB: 8.6; 5 TABLET, FILM COATED ORAL at 23:00

## 2021-01-06 RX ADMIN — CEFTRIAXONE SCH MLS: 1 INJECTION, POWDER, FOR SOLUTION INTRAMUSCULAR; INTRAVENOUS at 16:09

## 2021-01-06 RX ADMIN — BUPROPION HYDROCHLORIDE SCH: 150 TABLET, FILM COATED, EXTENDED RELEASE ORAL at 15:08

## 2021-01-06 NOTE — PDOC.HOSPP
- Subjective


Encounter Date: 01/01/21


Encounter Time: 14:10


Subjective: 


Pt seen for followup re: COVID-19 infection.  Feels ok today.





- Objective


Vital Signs & Weight: 


                             Vital Signs (12 hours)











  Temp Pulse Resp BP Pulse Ox


 


 01/01/21 14:56   63   


 


 01/01/21 08:40   63   


 


 01/01/21 08:00  98.4 F  65  18  152/75 H  92 L


 


 01/01/21 04:00  97.9 F  63  18  159/73 H  18 L








                                     Weight











Weight                         144 lb














I&O: 


                                        











 12/31/20 01/01/21 01/02/21





 06:59 06:59 06:59


 


Intake Total 840 960 600


 


Balance 840 960 600











Result Diagrams: 


                                 01/01/21 06:17





                                 01/01/21 06:17


Additional Labs: 


I reviewed patient's labs and MAR





Hospitalist ROS





- Review of Systems


Respiratory: reports: SOB with excertion


Cardiovascular: denies: chest pain, palpitations, orthopnea, paroxysmal noc. 

dyspnea, edema, light headedness


Gastrointestinal: denies: nausea, vomiting, abdominal pain, diarrhea, 

constipation, melena, hematochezia





- Medication


Medications: 


Active Medications











Generic Name Dose Route Start Last Admin





  Trade Name Freq  PRN Reason Stop Dose Admin


 


Acetaminophen  650 mg  12/27/20 13:43  12/29/20 11:10





  Acetaminophen 325 Mg Tab  PO   650 mg





  Q4H PRN   Administration





  Headache/Fever/Mild Pain (1-3)  


 


Ascorbic Acid  1,000 mg  12/28/20 09:00  01/01/21 08:40





  Ascorbic Acid 500 Mg Chewable Tablet  PO   1,000 mg





  DAILY GEMA   Administration


 


Azithromycin  500 mg  12/29/20 15:00  01/01/21 14:56





  Azithromycin 250 Mg Tab  PO  01/05/21 15:01  500 mg





  1500 GEMA   Administration


 


Bupropion HCl  150 mg  12/28/20 09:00  01/01/21 08:41





  Bupropion 150 Mg  Sr Tab  PO   150 mg





  DAILY GEMA   Administration


 


Carvedilol  12.5 mg  12/27/20 21:00  01/01/21 08:40





  Carvedilol 6.25 Mg Tab  PO   12.5 mg





  BID GEMA   Administration


 


Clonidine  0.2 mg  12/29/20 21:00  01/01/21 14:56





  Clonidine 0.2 Mg Tab  PO   0.2 mg





  TID GEMA   Administration


 


Clonidine  0.2 mg  12/30/20 05:26  12/31/20 04:48





  Clonidine 0.2 Mg Tab  PO   0.2 mg





  QID PRN   Administration





  SBP Greater Than 170  


 


Clopidogrel Bisulfate  75 mg  12/28/20 09:00  01/01/21 08:41





  Clopidogrel Bisulfate 75 Mg Tab  PO   75 mg





  DAILY GEMA   Administration


 


Dexamethasone  6 mg  12/28/20 08:00  01/01/21 08:41





  Dexamethasone 4 Mg Tab  PO   6 mg





  QAM-WM GEMA   Administration


 


Guaifenesin/Dextromethorphan  15 ml  12/27/20 13:49  12/27/20 21:13





  Guaifenesin Dm 100-10/5 Ml Udcup  PO   15 ml





  Q4H PRN   Administration





  Cough  


 


Heparin Sodium (Porcine)  5,000 units  12/27/20 15:00  01/01/21 14:57





  Heparin 5,000 Units/Ml Vial  SC   5,000 units





  TID GEMA   Administration


 


Hydralazine HCl  10 mg  12/27/20 13:48  12/28/20 05:30





  Hydralazine 20 Mg/Ml Vial  SLOW IVP   10 mg





  Q4H PRN   Administration





  SBP GREATER THAN 160  


 


Hydralazine HCl  100 mg  12/27/20 21:00  01/01/21 14:56





  Hydralazine 25 Mg Tab  PO   100 mg





  TID GEMA   Administration


 


Ceftriaxone Sodium 1 gm/  100 mls @ 200 mls/hr  12/27/20 16:00  01/01/21 15:39





  Sodium Chloride  IVPB   100 mls





  Q24HR GEMA   Administration


 


Levothyroxine Sodium  50 mcg  12/31/20 06:00  01/01/21 05:55





  Levothyroxine Sodium 50 Mcg Tab  PO   50 mcg





  0600 GEMA   Administration


 


Lidocaine  1 patch  12/27/20 21:00  12/31/20 20:35





  Lidocaine 5% Patch  TD   1 patch





  2100 GEMA   Administration


 


Miscellaneous Medication  1 each  12/28/20 09:00  01/01/21 08:42





  Lidocaine Patch Removal 1 Each  TOP   1 each





  0900 GEMA   Administration


 


Rosuvastatin Calcium  40 mg  12/27/20 21:00  12/31/20 20:35





  Rosuvastatin 20 Mg Tab  PO   40 mg





  HS GEMA   Administration


 


Zinc Sulfate  220 mg  12/28/20 09:00  01/01/21 08:41





  Zinc Sulfate 220 Mg Cap  PO   220 mg





  DAILY GEMA   Administration














- Exam


General Appearance: awake alert


Eye: anicteric sclera


ENT: normocephalic atraumatic


Neck: supple


Heart: RRR


Respiratory: rhonchi


Gastrointestinal: soft


Extremities: no cyanosis, no clubbing


Skin: no rashes


Psychiatric: normal affect, normal behavior





Hosp A/P





- Plan





-Assessment/plan








COVID-19 pneumonia


Patient is on dexamethasone, not a candidate for remdesivir.


Continue vitamin C and zinc.


Patient continues to need supplemental oxygen, is on 4 L/min today.





Acute hypoxic respiratory failure


Continue oxygen as needed.





Chronic kidney disease


Stable





Hypothyroidism


Stable, patient is on levothyroxine





Hypertension


Continue clonidine 0.2 mg 3 times a day.  Monitor vital signs and titrate 

antihypertensives as needed.





Diastolic heart failure


Stable





History of TIA


Stable, continue Plavix
- Subjective


Encounter Date: 01/02/21


Encounter Time: 14:30


Subjective: 


Patient seen in follow-up regarding Covid 19 pneumonia.  She denies any new 

complaints.





- Objective


Vital Signs & Weight: 


                             Vital Signs (12 hours)











  Temp Pulse Resp BP Pulse Ox


 


 01/02/21 12:00  98.3 F  66  18  149/77 H  92 L


 


 01/02/21 08:00  98.2 F  62  18  126/73  95








                                     Weight











Weight                         144 lb














I&O: 


                                        











 01/01/21 01/02/21 01/03/21





 06:59 06:59 06:59


 


Intake Total 960 840 


 


Balance 960 840 











Result Diagrams: 


                                 01/01/21 06:17





                                 01/01/21 06:17


Additional Labs: 


Labs and MAR reviewed by me





Hospitalist ROS





- Review of Systems


Respiratory: reports: cough, dry, SOB with excertion


Cardiovascular: denies: chest pain, palpitations, orthopnea, paroxysmal noc. 

dyspnea, edema, light headedness


Gastrointestinal: denies: nausea, vomiting, abdominal pain, diarrhea, 

constipation, melena, hematochezia





- Medication


Medications: 


Active Medications











Generic Name Dose Route Start Last Admin





  Trade Name Freq  PRN Reason Stop Dose Admin


 


Acetaminophen  650 mg  12/27/20 13:43  12/29/20 11:10





  Acetaminophen 325 Mg Tab  PO   650 mg





  Q4H PRN   Administration





  Headache/Fever/Mild Pain (1-3)  


 


Ascorbic Acid  1,000 mg  12/28/20 09:00  01/02/21 09:21





  Ascorbic Acid 500 Mg Chewable Tablet  PO   1,000 mg





  DAILY GEMA   Administration


 


Azithromycin  500 mg  12/29/20 15:00  01/02/21 14:54





  Azithromycin 250 Mg Tab  PO  01/05/21 15:01  500 mg





  1500 GEMA   Administration


 


Bupropion HCl  150 mg  12/28/20 09:00  01/02/21 09:22





  Bupropion 150 Mg  Sr Tab  PO   150 mg





  DAILY GEMA   Administration


 


Carvedilol  12.5 mg  12/27/20 21:00  01/02/21 09:22





  Carvedilol 6.25 Mg Tab  PO   12.5 mg





  BID GEMA   Administration


 


Clonidine  0.2 mg  12/29/20 21:00  01/02/21 13:30





  Clonidine 0.2 Mg Tab  PO   0.2 mg





  TID GEMA   Administration


 


Clonidine  0.2 mg  12/30/20 05:26  01/02/21 04:55





  Clonidine 0.2 Mg Tab  PO   0.2 mg





  QID PRN   Administration





  SBP Greater Than 170  


 


Clopidogrel Bisulfate  75 mg  12/28/20 09:00  01/02/21 09:22





  Clopidogrel Bisulfate 75 Mg Tab  PO   75 mg





  DAILY GEMA   Administration


 


Dexamethasone  6 mg  12/28/20 08:00  01/02/21 10:34





  Dexamethasone 4 Mg Tab  PO   6 mg





  QAM-WM GEMA   Administration


 


Guaifenesin/Dextromethorphan  15 ml  12/27/20 13:49  12/27/20 21:13





  Guaifenesin Dm 100-10/5 Ml Udcup  PO   15 ml





  Q4H PRN   Administration





  Cough  


 


Heparin Sodium (Porcine)  5,000 units  12/27/20 15:00  01/02/21 14:55





  Heparin 5,000 Units/Ml Vial  SC   5,000 units





  TID GEMA   Administration


 


Hydralazine HCl  10 mg  12/27/20 13:48  12/28/20 05:30





  Hydralazine 20 Mg/Ml Vial  SLOW IVP   10 mg





  Q4H PRN   Administration





  SBP GREATER THAN 160  


 


Hydralazine HCl  100 mg  12/27/20 21:00  01/02/21 14:54





  Hydralazine 25 Mg Tab  PO   100 mg





  TID GEMA   Administration


 


Ceftriaxone Sodium 1 gm/  100 mls @ 200 mls/hr  12/27/20 16:00  01/02/21 14:55





  Sodium Chloride  IVPB   100 mls





  Q24HR GEMA   Administration


 


Levothyroxine Sodium  50 mcg  12/31/20 06:00  01/02/21 04:54





  Levothyroxine Sodium 50 Mcg Tab  PO   50 mcg





  0600 GEMA   Administration


 


Lidocaine  1 patch  12/27/20 21:00  01/01/21 21:09





  Lidocaine 5% Patch  TD   1 patch





  2100 GEMA   Administration


 


Miscellaneous Medication  1 each  12/28/20 09:00  01/02/21 09:23





  Lidocaine Patch Removal 1 Each  TOP   1 each





  0900 GEMA   Administration


 


Rosuvastatin Calcium  40 mg  12/27/20 21:00  01/01/21 21:10





  Rosuvastatin 20 Mg Tab  PO   40 mg





  HS GEMA   Administration


 


Zinc Sulfate  220 mg  12/28/20 09:00  01/02/21 09:22





  Zinc Sulfate 220 Mg Cap  PO   220 mg





  DAILY GEMA   Administration














- Exam


General Appearance: awake alert


Eye: anicteric sclera


ENT: moist mucosa


Neck: supple


Heart: RRR


Respiratory: normal chest expansion, rhonchi


Gastrointestinal: soft, non-tender


Skin: no rashes


Psychiatric: normal affect, normal behavior





Hosp A/P





- Plan





-Assessment/plan








COVID-19 pneumonia


Continue dexamethasone.


Continue vitamin C and zinc.


Patient continues to need supplemental oxygen.





Acute hypoxic respiratory failure


oxygen as needed.





Chronic kidney disease


Stable





Hypothyroidism


Stable





Hypertension


Monitor vital signs and titrate antihypertensives as needed.





Diastolic heart failure


Stable





History of TIA


Stable, continue Plavix
- Subjective


Encounter Date: 01/03/21


Encounter Time: 11:30


Subjective: 


Patient seen for follow-up regarding COVID-19 pneumonia.  Reports feeling 

better.





- Objective


Vital Signs & Weight: 


                             Vital Signs (12 hours)











  Temp Pulse Resp BP BP Pulse Ox


 


 01/03/21 12:00   61  20   138/74  92 L


 


 01/03/21 08:00  98.1 F  60  20   167/84 H  93 L


 


 01/03/21 05:02     183/81 H  


 


 01/03/21 04:00  98.0 F  65  18   164/77 H  93 L








                                     Weight











Weight                         144 lb














I&O: 


                                        











 01/02/21 01/03/21 01/04/21





 06:59 06:59 06:59


 


Intake Total 840 730 


 


Balance 840 730 











Result Diagrams: 


                                 01/01/21 06:17





                                 01/01/21 06:17


Additional Labs: 


I reviewed patient's labs and MAR





Hospitalist ROS





- Review of Systems


Respiratory: reports: SOB with excertion


Cardiovascular: denies: chest pain, palpitations, orthopnea, paroxysmal noc. 

dyspnea, edema, light headedness


Gastrointestinal: denies: nausea, vomiting, abdominal pain, diarrhea, 

constipation, melena, hematochezia





- Medication


Medications: 


Active Medications











Generic Name Dose Route Start Last Admin





  Trade Name Freq  PRN Reason Stop Dose Admin


 


Acetaminophen  650 mg  12/27/20 13:43  12/29/20 11:10





  Acetaminophen 325 Mg Tab  PO   650 mg





  Q4H PRN   Administration





  Headache/Fever/Mild Pain (1-3)  


 


Ascorbic Acid  1,000 mg  12/28/20 09:00  01/03/21 08:10





  Ascorbic Acid 500 Mg Chewable Tablet  PO   1,000 mg





  DAILY GEMA   Administration


 


Azithromycin  500 mg  12/29/20 15:00  01/02/21 14:54





  Azithromycin 250 Mg Tab  PO  01/05/21 15:01  500 mg





  1500 GEMA   Administration


 


Bupropion HCl  150 mg  12/28/20 09:00  01/03/21 08:11





  Bupropion 150 Mg  Sr Tab  PO   150 mg





  DAILY GEMA   Administration


 


Carvedilol  12.5 mg  12/27/20 21:00  01/03/21 08:10





  Carvedilol 6.25 Mg Tab  PO   12.5 mg





  BID GEMA   Administration


 


Clonidine  0.2 mg  12/29/20 21:00  01/03/21 08:10





  Clonidine 0.2 Mg Tab  PO   0.2 mg





  TID GEMA   Administration


 


Clonidine  0.2 mg  12/30/20 05:26  01/03/21 05:02





  Clonidine 0.2 Mg Tab  PO   0.2 mg





  QID PRN   Administration





  SBP Greater Than 170  


 


Clopidogrel Bisulfate  75 mg  12/28/20 09:00  01/03/21 08:10





  Clopidogrel Bisulfate 75 Mg Tab  PO   75 mg





  DAILY GEMA   Administration


 


Dexamethasone  6 mg  12/28/20 08:00  01/03/21 08:09





  Dexamethasone 4 Mg Tab  PO   6 mg





  QAM-WM GEMA   Administration


 


Guaifenesin/Dextromethorphan  15 ml  12/27/20 13:49  12/27/20 21:13





  Guaifenesin Dm 100-10/5 Ml Udcup  PO   15 ml





  Q4H PRN   Administration





  Cough  


 


Heparin Sodium (Porcine)  5,000 units  12/27/20 15:00  01/03/21 08:11





  Heparin 5,000 Units/Ml Vial  SC   5,000 units





  TID GEMA   Administration


 


Hydralazine HCl  10 mg  12/27/20 13:48  12/28/20 05:30





  Hydralazine 20 Mg/Ml Vial  SLOW IVP   10 mg





  Q4H PRN   Administration





  SBP GREATER THAN 160  


 


Hydralazine HCl  100 mg  12/27/20 21:00  01/03/21 08:09





  Hydralazine 25 Mg Tab  PO   100 mg





  TID GEMA   Administration


 


Ceftriaxone Sodium 1 gm/  100 mls @ 200 mls/hr  12/27/20 16:00  01/02/21 14:55





  Sodium Chloride  IVPB   100 mls





  Q24HR GEMA   Administration


 


Levothyroxine Sodium  50 mcg  12/31/20 06:00  01/03/21 05:02





  Levothyroxine Sodium 50 Mcg Tab  PO   50 mcg





  0600 GEMA   Administration


 


Lidocaine  1 patch  12/27/20 21:00  01/02/21 20:00





  Lidocaine 5% Patch  TD   1 patch





  2100 GEMA   Administration


 


Miscellaneous Medication  1 each  12/28/20 09:00  01/03/21 08:11





  Lidocaine Patch Removal 1 Each  TOP   1 each





  0900 GEMA   Administration


 


Pantoprazole Sodium  40 mg  01/03/21 09:00  01/03/21 08:09





  Pantoprazole 40 Mg Tab  PO   40 mg





  DAILY GEMA   Administration


 


Rosuvastatin Calcium  40 mg  12/27/20 21:00  01/02/21 20:00





  Rosuvastatin 20 Mg Tab  PO   40 mg





  HS GEMA   Administration


 


Zinc Sulfate  220 mg  12/28/20 09:00  01/03/21 08:11





  Zinc Sulfate 220 Mg Cap  PO   220 mg





  DAILY GEMA   Administration














- Exam


General Appearance: awake alert


Eye: PERRL


ENT: no oropharyngeal lesions


Neck: supple


Heart: RRR


Respiratory: rales, rhonchi


Gastrointestinal: soft


Skin: no rashes


Psychiatric: normal affect, normal behavior





Hosp A/P





- Plan





-Assessment/plan








COVID-19 pneumonia


Continue dexamethasone.


Continue vitamin C and zinc.


Patient continues to need supplemental oxygen, improvement has been slow.





Acute hypoxic respiratory failure


Continue oxygen as needed.





Chronic kidney disease


Stable





Hypothyroidism


Stable





Hypertension


Monitor vital signs and titrate antihypertensives as needed.





Diastolic heart failure


Stable





History of TIA


continue Plavix
- Subjective


Encounter Date: 01/04/21


Encounter Time: 12:00


Subjective: 


Patient ambulating well. Oxygen requirement coming down a little to 4L NC. No 

other complaints.





- Objective


Vital Signs & Weight: 


                             Vital Signs (12 hours)











  Temp Pulse Resp BP BP Pulse Ox


 


 01/04/21 09:16  97.5 F L  53 L  20   183/80 H  99


 


 01/04/21 05:15  97.5 F L  61  18   150/82 H  99


 


 01/04/21 00:38     171/81 H  


 


 01/04/21 00:30  97.8 F  61  18   171/81 H  98








                                     Weight











Weight                         144 lb














I&O: 


                                        











 01/03/21 01/04/21 01/05/21





 06:59 06:59 06:59


 


Intake Total 730 810 


 


Balance 730 810 











Result Diagrams: 


                                 01/01/21 06:17





                                 01/01/21 06:17





Hospitalist ROS





- Review of Systems


Constitutional: denies: fever, chills


Respiratory: reports: cough, SOB with excertion.  denies: shortness of breath


Cardiovascular: denies: chest pain, palpitations


Gastrointestinal: denies: nausea, vomiting, abdominal pain





- Medication


Medications: 


Active Medications











Generic Name Dose Route Start Last Admin





  Trade Name Freq  PRN Reason Stop Dose Admin


 


Acetaminophen  650 mg  12/27/20 13:43  12/29/20 11:10





  Acetaminophen 325 Mg Tab  PO   650 mg





  Q4H PRN   Administration





  Headache/Fever/Mild Pain (1-3)  


 


Ascorbic Acid  1,000 mg  12/28/20 09:00  01/04/21 09:03





  Ascorbic Acid 500 Mg Chewable Tablet  PO   1,000 mg





  DAILY GEMA   Administration


 


Azithromycin  500 mg  12/29/20 15:00  01/03/21 15:31





  Azithromycin 250 Mg Tab  PO  01/05/21 15:01  500 mg





  1500 GEMA   Administration


 


Bupropion HCl  150 mg  12/28/20 09:00  01/04/21 09:01





  Bupropion 150 Mg  Sr Tab  PO   150 mg





  DAILY GEMA   Administration


 


Carvedilol  12.5 mg  12/27/20 21:00  01/04/21 09:03





  Carvedilol 6.25 Mg Tab  PO   12.5 mg





  BID GEMA   Administration


 


Clonidine  0.2 mg  12/29/20 21:00  01/04/21 09:01





  Clonidine 0.2 Mg Tab  PO   0.2 mg





  TID GEMA   Administration


 


Clonidine  0.2 mg  12/30/20 05:26  01/04/21 00:38





  Clonidine 0.2 Mg Tab  PO   0.2 mg





  QID PRN   Administration





  SBP Greater Than 170  


 


Clopidogrel Bisulfate  75 mg  12/28/20 09:00  01/04/21 09:03





  Clopidogrel Bisulfate 75 Mg Tab  PO   75 mg





  DAILY GEMA   Administration


 


Dexamethasone  6 mg  12/28/20 08:00  01/04/21 09:03





  Dexamethasone 4 Mg Tab  PO   6 mg





  QAM-WM GEMA   Administration


 


Guaifenesin/Dextromethorphan  15 ml  12/27/20 13:49  12/27/20 21:13





  Guaifenesin Dm 100-10/5 Ml Udcup  PO   15 ml





  Q4H PRN   Administration





  Cough  


 


Heparin Sodium (Porcine)  5,000 units  12/27/20 15:00  01/04/21 09:01





  Heparin 5,000 Units/Ml Vial  SC   5,000 units





  TID GEMA   Administration


 


Hydralazine HCl  10 mg  12/27/20 13:48  12/28/20 05:30





  Hydralazine 20 Mg/Ml Vial  SLOW IVP   10 mg





  Q4H PRN   Administration





  SBP GREATER THAN 160  


 


Hydralazine HCl  100 mg  12/27/20 21:00  01/04/21 09:03





  Hydralazine 25 Mg Tab  PO   100 mg





  TID GEMA   Administration


 


Ceftriaxone Sodium 1 gm/  100 mls @ 200 mls/hr  12/27/20 16:00  01/03/21 15:31





  Sodium Chloride  IVPB   100 mls





  Q24HR GEMA   Administration


 


Levothyroxine Sodium  50 mcg  12/31/20 06:00  01/04/21 05:10





  Levothyroxine Sodium 50 Mcg Tab  PO   50 mcg





  0600 GEMA   Administration


 


Lidocaine  1 patch  12/27/20 21:00  01/03/21 20:38





  Lidocaine 5% Patch  TD   1 patch





  2100 GEMA   Administration


 


Miscellaneous Medication  1 each  12/28/20 09:00  01/04/21 09:04





  Lidocaine Patch Removal 1 Each  TOP   1 each





  0900 GEMA   Administration


 


Pantoprazole Sodium  40 mg  01/03/21 09:00  01/04/21 09:04





  Pantoprazole 40 Mg Tab  PO   40 mg





  DAILY GEMA   Administration


 


Rosuvastatin Calcium  40 mg  12/27/20 21:00  01/03/21 20:38





  Rosuvastatin 20 Mg Tab  PO   40 mg





  HS GEMA   Administration


 


Zinc Sulfate  220 mg  12/28/20 09:00  01/04/21 09:03





  Zinc Sulfate 220 Mg Cap  PO   220 mg





  DAILY GEMA   Administration














- Exam


General Appearance: NAD, awake alert


ENT: moist mucosa


Heart: RRR, no murmur, no gallops, no rubs


Respiratory: CTAB, no wheezes, no rales, no ronchi


Gastrointestinal: soft, non-tender, non-distended, normal bowel sounds


Extremities: no edema


Psychiatric: normal affect, normal behavior, A&O x 3





Hosp A/P





- Plan





COVID-19 pneumonia


Continue dexamethasone.


Continue vitamin C and zinc.


Patient continues to need supplemental oxygen, down to 4L NC this AM, 

improvement has been slow.





Acute hypoxic respiratory failure


Continue oxygen as needed.





Chronic kidney disease


Stable, improving some, can change heparin to Lovenox





Hypothyroidism


Stable





Hypertension


Monitor vital signs and titrate antihypertensives as needed.





Diastolic heart failure


Stable





History of TIA


continue Plavix
- Subjective


Encounter Date: 01/05/21


Encounter Time: 12:30


Subjective: 


Patient seen for follow-up regarding pneumonia.  Patient reports feeling better 

today.





- Objective


Vital Signs & Weight: 


                             Vital Signs (12 hours)











  Temp Pulse Resp BP BP Pulse Ox


 


 01/05/21 14:46     178/73 H  


 


 01/05/21 14:45   62   178/73 H  


 


 01/05/21 08:38   62   178/73 H  


 


 01/05/21 08:36     178/73 H  


 


 01/05/21 08:00  97.6 F  69  20   158/83 H  90 L








                                     Weight











Weight                         144 lb














I&O: 


                                        











 01/04/21 01/05/21 01/06/21





 06:59 06:59 06:59


 


Intake Total 810 1310 960


 


Balance 810 1310 960











Result Diagrams: 


                                 01/01/21 06:17





                                 01/01/21 06:17


Additional Labs: 


Labs and MAR reviewed by me





Hospitalist ROS





- Review of Systems


Constitutional: denies: fever, chills, sweats, weakness, malaise


Respiratory: reports: cough, dry, SOB with excertion.  denies: shortness of 

breath, hemoptysis, pleuritic pain, sputum, wheezing





- Medication


Medications: 


Active Medications











Generic Name Dose Route Start Last Admin





  Trade Name Freq  PRN Reason Stop Dose Admin


 


Acetaminophen  650 mg  12/27/20 13:43  12/29/20 11:10





  Acetaminophen 325 Mg Tab  PO   650 mg





  Q4H PRN   Administration





  Headache/Fever/Mild Pain (1-3)  


 


Ascorbic Acid  1,000 mg  12/28/20 09:00  01/05/21 11:01





  Ascorbic Acid 500 Mg Chewable Tablet  PO   1,000 mg





  DAILY GEMA   Administration


 


Bupropion HCl  150 mg  12/28/20 09:00  01/05/21 08:40





  Bupropion 150 Mg  Sr Tab  PO   150 mg





  DAILY GEMA   Administration


 


Carvedilol  12.5 mg  12/27/20 21:00  01/05/21 08:38





  Carvedilol 6.25 Mg Tab  PO   12.5 mg





  BID GEMA   Administration


 


Clonidine  0.2 mg  12/29/20 21:00  01/05/21 14:46





  Clonidine 0.2 Mg Tab  PO   0.2 mg





  TID GEMA   Administration


 


Clonidine  0.2 mg  12/30/20 05:26  01/05/21 05:13





  Clonidine 0.2 Mg Tab  PO   0.2 mg





  QID PRN   Administration





  SBP Greater Than 170  


 


Clopidogrel Bisulfate  75 mg  12/28/20 09:00  01/05/21 08:38





  Clopidogrel Bisulfate 75 Mg Tab  PO   75 mg





  DAILY GEMA   Administration


 


Dexamethasone  6 mg  12/28/20 08:00  01/05/21 08:36





  Dexamethasone 4 Mg Tab  PO   6 mg





  QAM-WM GEMA   Administration


 


Enoxaparin Sodium  40 mg  01/04/21 21:00  01/05/21 08:36





  Enoxaparin Sodium 40 Mg/0.4 Ml Syringe  SC   40 mg





  0900,2100 GEMA   Administration


 


Guaifenesin/Dextromethorphan  15 ml  12/27/20 13:49  12/27/20 21:13





  Guaifenesin Dm 100-10/5 Ml Udcup  PO   15 ml





  Q4H PRN   Administration





  Cough  


 


Hydralazine HCl  10 mg  12/27/20 13:48  12/28/20 05:30





  Hydralazine 20 Mg/Ml Vial  SLOW IVP   10 mg





  Q4H PRN   Administration





  SBP GREATER THAN 160  


 


Hydralazine HCl  100 mg  12/27/20 21:00  01/05/21 14:45





  Hydralazine 25 Mg Tab  PO   100 mg





  TID GEMA   Administration


 


Ceftriaxone Sodium 1 gm/  100 mls @ 200 mls/hr  12/27/20 16:00  01/05/21 15:39





  Sodium Chloride  IVPB   100 mls





  Q24HR GEMA   Administration


 


Levothyroxine Sodium  50 mcg  12/31/20 06:00  01/05/21 05:13





  Levothyroxine Sodium 50 Mcg Tab  PO   50 mcg





  0600 GEMA   Administration


 


Lidocaine  1 patch  12/27/20 21:00  01/04/21 21:13





  Lidocaine 5% Patch  TD   1 patch





  2100 GEMA   Administration


 


Miscellaneous Medication  1 each  12/28/20 09:00  01/05/21 08:40





  Lidocaine Patch Removal 1 Each  TOP   1 each





  0900 GEMA   Administration


 


Pantoprazole Sodium  40 mg  01/03/21 09:00  01/05/21 08:38





  Pantoprazole 40 Mg Tab  PO   40 mg





  DAILY GEMA   Administration


 


Rosuvastatin Calcium  40 mg  12/27/20 21:00  01/04/21 21:13





  Rosuvastatin 20 Mg Tab  PO   40 mg





  HS GEMA   Administration


 


Senna/Docusate Sodium  2 tab  01/04/21 18:51  01/04/21 21:22





  Senokot S 8.6-50 Mg Tab  PO   2 tab





  BIDPRN PRN   Administration





  CONSTIPATION  


 


Zinc Sulfate  220 mg  12/28/20 09:00  01/05/21 11:01





  Zinc Sulfate 220 Mg Cap  PO   220 mg





  DAILY GEMA   Administration














- Exam


General Appearance: awake alert


ENT: normocephalic atraumatic, no oropharyngeal lesions


Neck: supple


Heart: RRR


Respiratory: no wheezes, rhonchi


Gastrointestinal: soft


Skin: no rashes


Psychiatric: normal affect





[facility]





- Plan





-Assessment/plan








COVID-19 pneumonia


Continue dexamethasone.


Continue vitamin C and zinc.


Patient's oxygen requirements have been decreasing.





Acute hypoxic respiratory failure


Continue oxygen for hypoxia.





Chronic kidney disease


Stable





Hypothyroidism


Stable





Hypertension


Stable.





Diastolic heart failure


Stable





History of TIA


continue Plavix
- Subjective


Encounter Date: 01/06/21


Encounter Time: 12:00


Subjective: 


Patient seen for follow-up regarding hypoxic respiratory failure.  She reports 

feeling better than yesterday.





- Objective


Vital Signs & Weight: 


                             Vital Signs (12 hours)











  Temp Pulse Resp BP Pulse Ox


 


 01/06/21 15:20   56 L   180/78 H 


 


 01/06/21 08:57  97.6 F  56 L  18   97


 


 01/06/21 08:19     180/78 H 


 


 01/06/21 08:18   56 L   


 


 01/06/21 08:00      97








                                     Weight











Weight                         144 lb














I&O: 


                                        











 01/05/21 01/06/21 01/07/21





 06:59 06:59 06:59


 


Intake Total 1310 960 960


 


Balance 1310 960 960











Result Diagrams: 


                                 01/01/21 06:17





                                 01/01/21 06:17


Additional Labs: 


I reviewed patient's labs and MAR





Hospitalist ROS





- Review of Systems


Respiratory: reports: SOB with excertion.  denies: cough, dry, shortness of 

breath, hemoptysis, pleuritic pain, sputum, wheezing


Gastrointestinal: denies: nausea, vomiting, abdominal pain, diarrhea, 

constipation, melena, hematochezia


Genitourinary: denies: dysuria, frequency, incontinence, hematuria, retention





- Medication


Medications: 


Active Medications











Generic Name Dose Route Start Last Admin





  Trade Name Orvilleq  PRN Reason Stop Dose Admin


 


Acetaminophen  650 mg  12/27/20 13:43  12/29/20 11:10





  Acetaminophen 325 Mg Tab  PO   650 mg





  Q4H PRN   Administration





  Headache/Fever/Mild Pain (1-3)  


 


Ascorbic Acid  1,000 mg  12/28/20 09:00  01/06/21 08:19





  Ascorbic Acid 500 Mg Chewable Tablet  PO   1,000 mg





  DAILY GEMA   Administration


 


Bupropion HCl  150 mg  12/28/20 09:00  01/06/21 15:08





  Bupropion 150 Mg  Sr Tab  PO   Not Given





  DAILY GEMA  


 


Carvedilol  12.5 mg  12/27/20 21:00  01/06/21 08:19





  Carvedilol 6.25 Mg Tab  PO   12.5 mg





  BID GEMA   Administration


 


Clonidine  0.2 mg  12/29/20 21:00  01/06/21 15:20





  Clonidine 0.2 Mg Tab  PO   0.2 mg





  TID GEMA   Administration


 


Clonidine  0.2 mg  12/30/20 05:26  01/05/21 05:13





  Clonidine 0.2 Mg Tab  PO   0.2 mg





  QID PRN   Administration





  SBP Greater Than 170  


 


Clopidogrel Bisulfate  75 mg  12/28/20 09:00  01/06/21 08:19





  Clopidogrel Bisulfate 75 Mg Tab  PO   75 mg





  DAILY GEMA   Administration


 


Dexamethasone  6 mg  12/28/20 08:00  01/06/21 08:19





  Dexamethasone 4 Mg Tab  PO   6 mg





  QAM-WM GEMA   Administration


 


Enoxaparin Sodium  40 mg  01/04/21 21:00  01/06/21 08:18





  Enoxaparin Sodium 40 Mg/0.4 Ml Syringe  SC   40 mg





  0900,2100 GEMA   Administration


 


Guaifenesin/Dextromethorphan  15 ml  12/27/20 13:49  12/27/20 21:13





  Guaifenesin Dm 100-10/5 Ml Udcup  PO   15 ml





  Q4H PRN   Administration





  Cough  


 


Hydralazine HCl  10 mg  12/27/20 13:48  12/28/20 05:30





  Hydralazine 20 Mg/Ml Vial  SLOW IVP   10 mg





  Q4H PRN   Administration





  SBP GREATER THAN 160  


 


Hydralazine HCl  100 mg  12/27/20 21:00  01/06/21 15:20





  Hydralazine 25 Mg Tab  PO   100 mg





  TID GEMA   Administration


 


Ceftriaxone Sodium 1 gm/  100 mls @ 200 mls/hr  12/27/20 16:00  01/06/21 16:09





  Sodium Chloride  IVPB   100 mls





  Q24HR GEMA   Administration


 


Levothyroxine Sodium  50 mcg  12/31/20 06:00  01/06/21 06:11





  Levothyroxine Sodium 50 Mcg Tab  PO   50 mcg





  0600 GEMA   Administration


 


Lidocaine  1 patch  12/27/20 21:00  01/05/21 22:00





  Lidocaine 5% Patch  TD   1 patch





  2100 GEMA   Administration


 


Miscellaneous Medication  1 each  12/28/20 09:00  01/06/21 08:19





  Lidocaine Patch Removal 1 Each  TOP   1 each





  0900 GEMA   Administration


 


Pantoprazole Sodium  40 mg  01/03/21 09:00  01/06/21 08:19





  Pantoprazole 40 Mg Tab  PO   40 mg





  DAILY GEMA   Administration


 


Rosuvastatin Calcium  40 mg  12/27/20 21:00  01/05/21 22:00





  Rosuvastatin 20 Mg Tab  PO   40 mg





  HS GEMA   Administration


 


Senna/Docusate Sodium  2 tab  01/04/21 18:51  01/04/21 21:22





  Senokot S 8.6-50 Mg Tab  PO   2 tab





  BIDPRN PRN   Administration





  CONSTIPATION  


 


Zinc Sulfate  220 mg  12/28/20 09:00  01/06/21 15:09





  Zinc Sulfate 220 Mg Cap  PO   Not Given





  DAILY GEMA  














- Exam


General Appearance: awake alert


Eye: anicteric sclera


ENT: normocephalic atraumatic


Neck: supple


Heart: RRR


Respiratory: CTAB, no wheezes


Gastrointestinal: soft, non-tender


Extremities: no edema


Skin: no rashes


Musculoskeletal: no muscle wasting


Psychiatric: normal affect, normal behavior





Hosp A/P





- Plan





-Assessment/plan








COVID-19 pneumonia


Continue dexamethasone, vitamin C and zinc.


Try to decrease patient's supplemental oxygen requirements.





Acute hypoxic respiratory failure


Improving with supplemental oxygen.





Chronic kidney disease


Stable





Hypothyroidism


Stable





Hypertension


Stable.





Diastolic heart failure


Stable





History of TIA


Patient is on Plavix
- Subjective


Encounter Date: 12/28/20


Encounter Time: 18:00


Subjective: 


Patient seen for follow-up for COVID-19 pneumonia.  She reports cough.





- Objective


Vital Signs & Weight: 


                             Vital Signs (12 hours)











  Temp Pulse Resp BP Pulse Ox


 


 12/29/20 16:55  98.4 F  67  20  151/93 H  94 L


 


 12/29/20 13:20  97.6 F  70  20  153/76 H  96


 


 12/29/20 11:21  98.1 F  65  20  184/82 H  92 L


 


 12/29/20 08:53  98.6 F  73  18  191/83 H  92 L


 


 12/29/20 08:00      92 L








                                     Weight











Weight                         144 lb














I&O: 


                                        











 12/28/20 12/29/20 12/30/20





 06:59 06:59 06:59


 


Intake Total 1100 1680 


 


Balance 1100 1680 











Result Diagrams: 


                                 12/29/20 07:09





                                 12/29/20 07:09


Additional Labs: 


I reviewed patient's labs and MAR





Hospitalist ROS





- Review of Systems


Respiratory: reports: cough, dry.  denies: shortness of breath, hemoptysis, SOB 

with excertion, pleuritic pain, sputum, wheezing


Cardiovascular: denies: chest pain, palpitations, orthopnea, paroxysmal noc. 

dyspnea, edema, light headedness





- Medication


Medications: 


Active Medications











Generic Name Dose Route Start Last Admin





  Trade Name Freq  PRN Reason Stop Dose Admin


 


Acetaminophen  650 mg  12/27/20 13:43  12/29/20 11:10





  Acetaminophen 325 Mg Tab  PO   650 mg





  Q4H PRN   Administration





  Headache/Fever/Mild Pain (1-3)  


 


Ascorbic Acid  1,000 mg  12/28/20 09:00  12/29/20 09:02





  Ascorbic Acid 500 Mg Chewable Tablet  PO   1,000 mg





  DAILY GEMA   Administration


 


Azithromycin  500 mg  12/29/20 15:00  12/29/20 14:19





  Azithromycin 250 Mg Tab  PO  01/05/21 15:01  500 mg





  1500 GEMA   Administration


 


Bupropion HCl  150 mg  12/28/20 09:00  12/29/20 09:02





  Bupropion 150 Mg  Sr Tab  PO   150 mg





  DAILY GEMA   Administration


 


Carvedilol  12.5 mg  12/27/20 21:00  12/29/20 09:02





  Carvedilol 6.25 Mg Tab  PO   12.5 mg





  BID GEAM   Administration


 


Clonidine  0.1 mg  12/28/20 21:00  12/29/20 11:10





  Clonidine 0.1 Mg Tab  PO   0.1 mg





  TID GEMA   Administration


 


Clopidogrel Bisulfate  75 mg  12/28/20 09:00  12/29/20 09:02





  Clopidogrel Bisulfate 75 Mg Tab  PO   75 mg





  DAILY GEMA   Administration


 


Dexamethasone  6 mg  12/28/20 08:00  12/29/20 09:02





  Dexamethasone 4 Mg Tab  PO   6 mg





  QAM-WM GEMA   Administration


 


Guaifenesin/Dextromethorphan  15 ml  12/27/20 13:49  12/27/20 21:13





  Guaifenesin Dm 100-10/5 Ml Udcup  PO   15 ml





  Q4H PRN   Administration





  Cough  


 


Heparin Sodium (Porcine)  5,000 units  12/27/20 15:00  12/29/20 14:19





  Heparin 5,000 Units/Ml Vial  SC   5,000 units





  TID GEMA   Administration


 


Hydralazine HCl  10 mg  12/27/20 13:48  12/28/20 05:30





  Hydralazine 20 Mg/Ml Vial  SLOW IVP   10 mg





  Q4H PRN   Administration





  SBP GREATER THAN 160  


 


Hydralazine HCl  100 mg  12/27/20 21:00  12/29/20 14:19





  Hydralazine 25 Mg Tab  PO   100 mg





  TID GEMA   Administration


 


Ceftriaxone Sodium 1 gm/  100 mls @ 200 mls/hr  12/27/20 16:00  12/29/20 14:19





  Sodium Chloride  IVPB   100 mls





  Q24HR GEMA   Administration


 


Levothyroxine Sodium  75 mcg  12/28/20 06:00  12/29/20 06:14





  Levothyroxine Sodium 75 Mcg Tab  PO   75 mcg





  0600 GEMA   Administration


 


Lidocaine  1 patch  12/27/20 21:00  12/28/20 20:15





  Lidocaine 5% Patch  TD   1 patch





  2100 GEMA   Administration


 


Miscellaneous Medication  1 each  12/28/20 09:00  12/29/20 10:06





  Lidocaine Patch Removal 1 Each  TOP   1 each





  0900 GEMA   Administration


 


Rosuvastatin Calcium  40 mg  12/27/20 21:00  12/28/20 20:15





  Rosuvastatin 20 Mg Tab  PO   40 mg





  HS GEMA   Administration


 


Zinc Sulfate  220 mg  12/28/20 09:00  12/29/20 09:02





  Zinc Sulfate 220 Mg Cap  PO   220 mg





  DAILY GEMA   Administration














- Exam


General Appearance: awake alert


Eye: anicteric sclera


ENT: moist mucosa


Neck: supple


Heart: RRR


Respiratory: CTAB


Gastrointestinal: soft, non-tender


Skin: no rashes


Psychiatric: normal affect, normal behavior





Hosp A/P





- Plan





-Assessment/plan








COVID-19 pneumonia


Continue dexamethasone, patient is also being started on remdesivir.


Continue vitamin C and zinc.





Acute hypoxic respiratory failure


Secondary to COVID-19 pneumonia, provide oxygen as needed.





Chronic kidney disease


Stable





Hypothyroidism


Continue levothyroxine





Hypertension


Monitor vital signs and titrate antihypertensives as needed.





Diastolic heart failure


Stable





History of TIA


Continue Plavix
- Subjective


Encounter Date: 12/29/20


Encounter Time: 11:00


Subjective: 


Patient seen for follow-up pneumonia secondary to COVID-19 infection.  Reports 

feeling better.





- Objective


Vital Signs & Weight: 


                             Vital Signs (12 hours)











  Temp Pulse Resp BP Pulse Ox


 


 12/29/20 16:55  98.4 F  67  20  151/93 H  94 L


 


 12/29/20 13:20  97.6 F  70  20  153/76 H  96


 


 12/29/20 11:21  98.1 F  65  20  184/82 H  92 L


 


 12/29/20 08:53  98.6 F  73  18  191/83 H  92 L


 


 12/29/20 08:00      92 L








                                     Weight











Weight                         144 lb














I&O: 


                                        











 12/28/20 12/29/20 12/30/20





 06:59 06:59 06:59


 


Intake Total 1100 1680 


 


Balance 1100 1680 











Result Diagrams: 


                                 12/29/20 07:09





                                 12/29/20 07:09


Additional Labs: 


Labs and MAR reviewed by me





Hospitalist ROS





- Review of Systems


Respiratory: reports: cough, dry


Cardiovascular: denies: chest pain, palpitations, orthopnea, paroxysmal noc. 

dyspnea, edema, light headedness


Gastrointestinal: denies: nausea, vomiting, abdominal pain, diarrhea, 

constipation, melena, hematochezia





- Medication


Medications: 


Active Medications











Generic Name Dose Route Start Last Admin





  Trade Name Freq  PRN Reason Stop Dose Admin


 


Acetaminophen  650 mg  12/27/20 13:43  12/29/20 11:10





  Acetaminophen 325 Mg Tab  PO   650 mg





  Q4H PRN   Administration





  Headache/Fever/Mild Pain (1-3)  


 


Ascorbic Acid  1,000 mg  12/28/20 09:00  12/29/20 09:02





  Ascorbic Acid 500 Mg Chewable Tablet  PO   1,000 mg





  DAILY GEMA   Administration


 


Azithromycin  500 mg  12/29/20 15:00  12/29/20 14:19





  Azithromycin 250 Mg Tab  PO  01/05/21 15:01  500 mg





  1500 GEMA   Administration


 


Bupropion HCl  150 mg  12/28/20 09:00  12/29/20 09:02





  Bupropion 150 Mg  Sr Tab  PO   150 mg





  DAILY GEMA   Administration


 


Carvedilol  12.5 mg  12/27/20 21:00  12/29/20 09:02





  Carvedilol 6.25 Mg Tab  PO   12.5 mg





  BID GEMA   Administration


 


Clonidine  0.1 mg  12/28/20 21:00  12/29/20 11:10





  Clonidine 0.1 Mg Tab  PO   0.1 mg





  TID GEMA   Administration


 


Clopidogrel Bisulfate  75 mg  12/28/20 09:00  12/29/20 09:02





  Clopidogrel Bisulfate 75 Mg Tab  PO   75 mg





  DAILY GEMA   Administration


 


Dexamethasone  6 mg  12/28/20 08:00  12/29/20 09:02





  Dexamethasone 4 Mg Tab  PO   6 mg





  QAM-WM GEMA   Administration


 


Guaifenesin/Dextromethorphan  15 ml  12/27/20 13:49  12/27/20 21:13





  Guaifenesin Dm 100-10/5 Ml Udcup  PO   15 ml





  Q4H PRN   Administration





  Cough  


 


Heparin Sodium (Porcine)  5,000 units  12/27/20 15:00  12/29/20 14:19





  Heparin 5,000 Units/Ml Vial  SC   5,000 units





  TID GMEA   Administration


 


Hydralazine HCl  10 mg  12/27/20 13:48  12/28/20 05:30





  Hydralazine 20 Mg/Ml Vial  SLOW IVP   10 mg





  Q4H PRN   Administration





  SBP GREATER THAN 160  


 


Hydralazine HCl  100 mg  12/27/20 21:00  12/29/20 14:19





  Hydralazine 25 Mg Tab  PO   100 mg





  TID GEMA   Administration


 


Ceftriaxone Sodium 1 gm/  100 mls @ 200 mls/hr  12/27/20 16:00  12/29/20 14:19





  Sodium Chloride  IVPB   100 mls





  Q24HR GEMA   Administration


 


Levothyroxine Sodium  75 mcg  12/28/20 06:00  12/29/20 06:14





  Levothyroxine Sodium 75 Mcg Tab  PO   75 mcg





  0600 GEMA   Administration


 


Lidocaine  1 patch  12/27/20 21:00  12/28/20 20:15





  Lidocaine 5% Patch  TD   1 patch





  2100 GEMA   Administration


 


Miscellaneous Medication  1 each  12/28/20 09:00  12/29/20 10:06





  Lidocaine Patch Removal 1 Each  TOP   1 each





  0900 GEMA   Administration


 


Rosuvastatin Calcium  40 mg  12/27/20 21:00  12/28/20 20:15





  Rosuvastatin 20 Mg Tab  PO   40 mg





  HS GEMA   Administration


 


Zinc Sulfate  220 mg  12/28/20 09:00  12/29/20 09:02





  Zinc Sulfate 220 Mg Cap  PO   220 mg





  DAILY GEMA   Administration














- Exam


General Appearance: awake alert


Eye: anicteric sclera


ENT: moist mucosa


Neck: supple


Heart: RRR


Respiratory: rhonchi


Gastrointestinal: soft


Extremities: no cyanosis


Skin: no rashes


Psychiatric: normal affect, normal behavior





Hosp A/P





- Plan





-Assessment/plan








COVID-19 pneumonia


Patient is on dexamethasone and remdesivir.


Continue vitamin C and zinc.





Acute hypoxic respiratory failure


Oxygen as needed





Chronic kidney disease


Stable





Hypothyroidism


Patient is on levothyroxine





Hypertension


Pressure is high, increase clonidine to 0.2 mg 3 times a day and give one-time 

dose of 0.1 mg clonidine.





Diastolic heart failure


Stable





History of TIA


Patient is on Plavix
- Subjective


Encounter Date: 12/30/20


Encounter Time: 11:15


Subjective: 


Patient up in bed no complaints. Patient continues to be short of breath and 

becomes hypoxic on minimal exertion.





- Objective


Vital Signs & Weight: 


                             Vital Signs (12 hours)











  Temp Pulse Resp BP Pulse Ox


 


 12/30/20 15:16   60   


 


 12/30/20 08:29   60   


 


 12/30/20 08:00  98.1 F  60  20  159/72 H  93 L


 


 12/30/20 06:37     134/76 


 


 12/30/20 05:10  97.6 F  65  20  196/96 H  91 L








                                     Weight











Weight                         144 lb














I&O: 


                                        











 12/29/20 12/30/20 12/31/20





 06:59 06:59 06:59


 


Intake Total 1680 480 600


 


Balance 1680 480 600











Result Diagrams: 


                                 12/30/20 08:05





                                 12/30/20 08:05





Hospitalist ROS





- Review of Systems


Respiratory: reports: cough, shortness of breath


Gastrointestinal: denies: nausea, vomiting, abdominal pain, diarrhea, 

constipation, melena, hematochezia, other





- Medication


Medications: 


Active Medications











Generic Name Dose Route Start Last Admin





  Trade Name Freq  PRN Reason Stop Dose Admin


 


Acetaminophen  650 mg  12/27/20 13:43  12/29/20 11:10





  Acetaminophen 325 Mg Tab  PO   650 mg





  Q4H PRN   Administration





  Headache/Fever/Mild Pain (1-3)  


 


Ascorbic Acid  1,000 mg  12/28/20 09:00  12/30/20 08:30





  Ascorbic Acid 500 Mg Chewable Tablet  PO   1,000 mg





  DAILY GEMA   Administration


 


Azithromycin  500 mg  12/29/20 15:00  12/30/20 15:16





  Azithromycin 250 Mg Tab  PO  01/05/21 15:01  500 mg





  1500 GEMA   Administration


 


Bupropion HCl  150 mg  12/28/20 09:00  12/30/20 08:29





  Bupropion 150 Mg  Sr Tab  PO   150 mg





  DAILY GEMA   Administration


 


Carvedilol  12.5 mg  12/27/20 21:00  12/30/20 08:29





  Carvedilol 6.25 Mg Tab  PO   12.5 mg





  BID GEMA   Administration


 


Clonidine  0.2 mg  12/29/20 21:00  12/30/20 15:17





  Clonidine 0.2 Mg Tab  PO   0.2 mg





  TID GEMA   Administration


 


Clonidine  0.2 mg  12/30/20 05:26  12/30/20 05:33





  Clonidine 0.2 Mg Tab  PO   0.2 mg





  QID PRN   Administration





  SBP Greater Than 170  


 


Clopidogrel Bisulfate  75 mg  12/28/20 09:00  12/30/20 08:30





  Clopidogrel Bisulfate 75 Mg Tab  PO   75 mg





  DAILY GEMA   Administration


 


Dexamethasone  6 mg  12/28/20 08:00  12/30/20 08:30





  Dexamethasone 4 Mg Tab  PO   6 mg





  QAM-WM GEMA   Administration


 


Guaifenesin/Dextromethorphan  15 ml  12/27/20 13:49  12/27/20 21:13





  Guaifenesin Dm 100-10/5 Ml Udcup  PO   15 ml





  Q4H PRN   Administration





  Cough  


 


Heparin Sodium (Porcine)  5,000 units  12/27/20 15:00  12/30/20 15:16





  Heparin 5,000 Units/Ml Vial  SC   5,000 units





  TID GEMA   Administration


 


Hydralazine HCl  10 mg  12/27/20 13:48  12/28/20 05:30





  Hydralazine 20 Mg/Ml Vial  SLOW IVP   10 mg





  Q4H PRN   Administration





  SBP GREATER THAN 160  


 


Hydralazine HCl  100 mg  12/27/20 21:00  12/30/20 15:16





  Hydralazine 25 Mg Tab  PO   100 mg





  TID GEMA   Administration


 


Ceftriaxone Sodium 1 gm/  100 mls @ 200 mls/hr  12/27/20 16:00  12/30/20 16:02





  Sodium Chloride  IVPB   100 mls





  Q24HR GEMA   Administration


 


Levothyroxine Sodium  75 mcg  12/28/20 06:00  12/30/20 05:00





  Levothyroxine Sodium 75 Mcg Tab  PO   75 mcg





  0600 GEMA   Administration


 


Lidocaine  1 patch  12/27/20 21:00  12/29/20 20:53





  Lidocaine 5% Patch  TD   1 patch





  2100 GEMA   Administration


 


Miscellaneous Medication  1 each  12/28/20 09:00  12/30/20 08:31





  Lidocaine Patch Removal 1 Each  TOP   1 each





  0900 GEMA   Administration


 


Rosuvastatin Calcium  40 mg  12/27/20 21:00  12/29/20 20:55





  Rosuvastatin 20 Mg Tab  PO   40 mg





  HS GEMA   Administration


 


Zinc Sulfate  220 mg  12/28/20 09:00  12/30/20 08:31





  Zinc Sulfate 220 Mg Cap  PO   220 mg





  DAILY GEMA   Administration














- Exam


Respiratory: negative: CTAB, no wheezes, no rales, no ronchi, normal chest 

expansion, no tachypnea, normal percussion, rales, rhonchi, tachypneic, wheezes


Gastrointestinal: negative: soft, non-tender, non-distended, normal bowel 

sounds, no palpable masses, no hepatomegaly, no splenomegaly, no bruit, no 

guarding, no rigidity, tender to palpation, distended, diminished bowl sounds, 

voluntary guarding


Extremities: negative: no cyanosis, no clubbing, no edema, 1+ LE edema, 2+ LE 

edema, clubbing





Hosp A/P





- Plan





COVID-19 pneumonia


Patient is on dexamethasone and remdesivir.


Continue vitamin C and zinc.





Acute hypoxic respiratory failure


Oxygen as needed





Chronic kidney disease


Stable





Hypothyroidism


Patient is on levothyroxine





Hypertension


Pressure is high, increase clonidine to 0.2 mg 3 times a day and give one-time 

dose of 0.1 mg clonidine.





Diastolic heart failure


Stable





History of TIA


Patient is on Plavix





12/30 we will order incentive spirometry. We will continue remdesivir and 

Decadron. We will decrease Synthroid to 50 MCG since patient's TSH was low. She 

initially was on 75 MCG.
- Subjective


Encounter Date: 12/31/20


Encounter Time: 09:30


Subjective: 


Patient seen for follow-up regarding COVID-19 pneumonia.  She reports feeling 

better.





- Objective


Vital Signs & Weight: 


                             Vital Signs (12 hours)











  Temp Pulse Resp BP Pulse Ox


 


 12/31/20 15:41   65   


 


 12/31/20 08:18   65   


 


 12/31/20 08:00  97.9 F  62  20  149/76 H  95








                                     Weight











Weight                         144 lb














I&O: 


                                        











 12/30/20 12/31/20 01/01/21





 06:59 06:59 06:59


 


Intake Total 480 840 600


 


Balance 480 840 600











Result Diagrams: 


                                 12/31/20 06:07





                                 12/31/20 06:07


Additional Labs: 


Labs and MAR reviewed by me





Hospitalist ROS





- Review of Systems


Respiratory: reports: cough, dry, SOB with excertion.  denies: shortness of 

breath, hemoptysis, pleuritic pain, sputum, wheezing


Cardiovascular: denies: chest pain, palpitations, orthopnea, paroxysmal noc. 

dyspnea, edema, light headedness





- Medication


Medications: 


Active Medications











Generic Name Dose Route Start Last Admin





  Trade Name Freq  PRN Reason Stop Dose Admin


 


Acetaminophen  650 mg  12/27/20 13:43  12/29/20 11:10





  Acetaminophen 325 Mg Tab  PO   650 mg





  Q4H PRN   Administration





  Headache/Fever/Mild Pain (1-3)  


 


Ascorbic Acid  1,000 mg  12/28/20 09:00  12/31/20 08:17





  Ascorbic Acid 500 Mg Chewable Tablet  PO   1,000 mg





  DAILY GEMA   Administration


 


Azithromycin  500 mg  12/29/20 15:00  12/31/20 15:41





  Azithromycin 250 Mg Tab  PO  01/05/21 15:01  500 mg





  1500 GEMA   Administration


 


Bupropion HCl  150 mg  12/28/20 09:00  12/31/20 08:17





  Bupropion 150 Mg  Sr Tab  PO   150 mg





  DAILY GEMA   Administration


 


Carvedilol  12.5 mg  12/27/20 21:00  12/31/20 08:18





  Carvedilol 6.25 Mg Tab  PO   12.5 mg





  BID GEMA   Administration


 


Clonidine  0.2 mg  12/29/20 21:00  12/31/20 15:44





  Clonidine 0.2 Mg Tab  PO   0.2 mg





  TID GEMA   Administration


 


Clonidine  0.2 mg  12/30/20 05:26  12/31/20 04:48





  Clonidine 0.2 Mg Tab  PO   0.2 mg





  QID PRN   Administration





  SBP Greater Than 170  


 


Clopidogrel Bisulfate  75 mg  12/28/20 09:00  12/31/20 08:18





  Clopidogrel Bisulfate 75 Mg Tab  PO   75 mg





  DAILY GEMA   Administration


 


Dexamethasone  6 mg  12/28/20 08:00  12/31/20 08:17





  Dexamethasone 4 Mg Tab  PO   6 mg





  QAM-WM GEMA   Administration


 


Guaifenesin/Dextromethorphan  15 ml  12/27/20 13:49  12/27/20 21:13





  Guaifenesin Dm 100-10/5 Ml Udcup  PO   15 ml





  Q4H PRN   Administration





  Cough  


 


Heparin Sodium (Porcine)  5,000 units  12/27/20 15:00  12/31/20 15:42





  Heparin 5,000 Units/Ml Vial  SC   5,000 units





  TID GEMA   Administration


 


Hydralazine HCl  10 mg  12/27/20 13:48  12/28/20 05:30





  Hydralazine 20 Mg/Ml Vial  SLOW IVP   10 mg





  Q4H PRN   Administration





  SBP GREATER THAN 160  


 


Hydralazine HCl  100 mg  12/27/20 21:00  12/31/20 15:41





  Hydralazine 25 Mg Tab  PO   100 mg





  TID GEMA   Administration


 


Ceftriaxone Sodium 1 gm/  100 mls @ 200 mls/hr  12/27/20 16:00  12/31/20 15:42





  Sodium Chloride  IVPB   100 mls





  Q24HR GEMA   Administration


 


Levothyroxine Sodium  50 mcg  12/31/20 06:00  12/31/20 04:32





  Levothyroxine Sodium 50 Mcg Tab  PO   50 mcg





  0600 GEMA   Administration


 


Lidocaine  1 patch  12/27/20 21:00  12/30/20 20:37





  Lidocaine 5% Patch  TD   1 patch





  2100 GEMA   Administration


 


Miscellaneous Medication  1 each  12/28/20 09:00  12/31/20 08:19





  Lidocaine Patch Removal 1 Each  TOP   1 each





  0900 GEMA   Administration


 


Rosuvastatin Calcium  40 mg  12/27/20 21:00  12/30/20 20:35





  Rosuvastatin 20 Mg Tab  PO   40 mg





  HS GEMA   Administration


 


Zinc Sulfate  220 mg  12/28/20 09:00  12/31/20 08:18





  Zinc Sulfate 220 Mg Cap  PO   220 mg





  DAILY GEMA   Administration














- Exam


General Appearance: awake alert


Eye: anicteric sclera


ENT: normocephalic atraumatic, no oropharyngeal lesions


Neck: symmetric, no lymphadenopathy


Heart: RRR


Respiratory: rhonchi


Gastrointestinal: soft, non-tender


Skin: no rashes


Psychiatric: normal affect, normal behavior





[facility]osp [facility]





- Plan





-Assessment/plan








COVID-19 pneumonia


Patient is on dexamethasone, not a candidate for remdesivir.


Continue vitamin C and zinc.





Acute hypoxic respiratory failure


Continue oxygen as needed.





Chronic kidney disease


Stable





Hypothyroidism


Continue levothyroxine





Hypertension


Continue clonidine 0.2 mg 3 times a day.





Diastolic heart failure


Stable





History of TIA


Patient is on Plavix
no pedal edema/no clubbing/normal/no cyanosis

## 2021-01-07 VITALS — TEMPERATURE: 98.6 F

## 2021-01-07 VITALS — DIASTOLIC BLOOD PRESSURE: 81 MMHG | SYSTOLIC BLOOD PRESSURE: 174 MMHG

## 2021-01-07 RX ADMIN — Medication SCH EACH: at 08:31

## 2021-01-07 RX ADMIN — Medication SCH MG: at 08:31

## 2021-01-07 RX ADMIN — BUPROPION HYDROCHLORIDE SCH MG: 150 TABLET, FILM COATED, EXTENDED RELEASE ORAL at 08:31

## 2021-01-07 NOTE — PDOC.DS.DS
Provider





- Provider


Date of Admission: 


12/27/20 13:06





Date of Discharge: 01/07/21


Admitting Provider: 


Carissa Hernandez MD





Primary Care Physician: 


Darlene Pemberton DO








Course





- Hospital Course


Hospital Course: 


Discharge diagnosis:


1.  Acute hypoxic respiratory failure


2.  COVID-19 pneumonia


3.  Hypertensive urgency


4.  Chronic kidney disease stage III





Hospital course:


Patient is a pleasant 65-year-old lady who was admitted to the hospital on 

December 27, 2020 for acute hypoxic respiratory failure secondary to COVID-19 

pneumonia.  She was treated with remdesivir, dexamethasone, oxygen, vitamin C 

and zinc.  Her oxygen requirements initially worsened but subsequently improved.

 She does require home oxygen, which is being arranged prior to discharge.





Many thanks for allowing me to participate in your patient's care.  Please feel 

free to contact me with any questions or concerns.





Discharge destination: Home


Total amount of time spent coordinating this discharge: 31 minutes


Resuscitation Status: 








12/27/20 13:43


Resuscitation Status Routine 


   Co-Sign Provider: 


   Resuscitation Status: FULL: Full Resuscitation











- Labs


Lab Results: 


                                        





                                 01/01/21 06:17 





                                 01/01/21 06:17 





Microbiology - Entire Visit





12/27/20 09:37   Venous blood - Left Hand   Blood Culture - Final


                            NO GROWTH IN 5 DAYS


12/27/20 09:37   Venous blood - Left Arm   Blood Culture - Final


                            NO GROWTH IN 5 DAYS











- Physical Exam


Vitals: 


                             Vital Signs (12 hours)











  Temp Pulse Resp BP BP Pulse Ox


 


 01/07/21 15:03     177/89 H  


 


 01/07/21 08:31     177/89 H  


 


 01/07/21 08:30   56 L   177/89 H  


 


 01/07/21 08:00  98.3 F  62  20   162/71 H  95








                                     Weight











Admit Weight                   144 lb


 


Weight                         144 lb














Physical Exam: 


The patient was seen and examined on the day of discharge.  Patient denies chest

pain or shortness of breath.  Vital signs are stable.  S1 and S2 are heard.  

Lungs are clear to auscultation bilaterally.








Plan





- Discharge Medications


Prescriptions: 


Dexamethasone 6 mg PO DAILY #3 tablet


Pantoprazole [Protonix] 40 mg PO DAILY #3 tab


Ascorbic Acid [Vitamin C] 1,000 mg PO DAILY #14 tab


Zinc Sulfate 220 mg PO DAILY #7 cap


Home Medications: 


                                        











 Medication  Instructions  Recorded  Confirmed  Type


 


ALButerol Sulfate [Ventolin Neb] 3 ml NEB Q6HR PRN 12/27/20 12/27/20 History


 


BuPROPion SR [Wellbutrin SR] 1 tablet PO DAILY 12/27/20 12/27/20 History


 


Carvedilol [Coreg] 12.5 mg PO BID 12/27/20 12/27/20 History


 


Clopidogrel Bisulfate [Plavix] 75 mg PO DAILY 12/27/20 12/27/20 History


 


Furosemide [Lasix] 10 mg PO Q2D 12/27/20 12/27/20 History


 


Levothyroxine Sodium [Synthroid] 75 mcg PO DAILY 12/27/20 12/27/20 History


 


Liothyronine Sodium [Cytomel] 5 mcg PO Q2D 12/27/20 12/27/20 History


 


Oxymetazoline HCl [Afrin Nasal 2 - 3 spray EA NARE BID PRN 12/27/20 12/27/20 

History





Spray]    


 


Potassium Chloride [K-Tab ER] 20 meq PO DAILY 12/27/20 12/27/20 History


 


Rosuvastatin Calcium [Crestor] 40 mg PO DAILY 12/27/20 12/27/20 History


 


hydrALAZINE HCl [Hydralazine HCl] 100 mg PO TID 12/27/20 12/27/20 History


 


Ascorbic Acid [Vitamin C] 1,000 mg PO DAILY #14 tab 01/07/21  Rx


 


Dexamethasone 6 mg PO DAILY #3 tablet 01/07/21  Rx


 


Pantoprazole [Protonix] 40 mg PO DAILY #3 tab 01/07/21  Rx


 


Zinc Sulfate 220 mg PO DAILY #7 cap 01/07/21  Rx











Allergies: 








lisinopril Allergy (Intermediate, Verified 12/27/20 14:08)


   angioedema








- Discharge Instructions


Discharge Instructions:: 


Check your blood pressure and heart rate 3 times a day and shows readings to 

your primary care provider.





- Follow up Plan


Referrals: 


[facility] [Outside]


Darlene Pemberton DO [Primary Care Provider] - 3 Days


Disposition: HOME





Quality





- Care Measures


CORE MEASURES:: N/A

## 2021-02-03 ENCOUNTER — HOSPITAL ENCOUNTER (OUTPATIENT)
Dept: HOSPITAL 92 - BICRAD | Age: 66
Discharge: HOME | End: 2021-02-03
Attending: INTERNAL MEDICINE
Payer: MEDICARE

## 2021-02-03 DIAGNOSIS — J18.9: ICD-10-CM

## 2021-02-03 DIAGNOSIS — R06.00: Primary | ICD-10-CM

## 2021-02-03 PROCEDURE — 71046 X-RAY EXAM CHEST 2 VIEWS: CPT

## 2021-02-03 NOTE — RAD
EXAM:

CHEST TWO VIEWS



2/3/2021 9:09 AM



HISTORY:

Dyspnea



COMPARISON:

None.



FINDINGS:



Lungs:  There is worsening bilateral peripheral groundglass airspace opacities suspicious for worseni
ng pneumonia



Heart:  There is stable mild cardiomegaly.



Pulmonary Vessels: Normal.



Costophrenic Angles: Clear.



Pneumothorax: None.



Osseous Structures:  Intact.



Additional Findings:   None.



IMPRESSION:

Worsening bilateral pneumonia. 



Reported By: Montrell Alfonso 

Electronically Signed:  2/3/2021 9:09 AM